# Patient Record
Sex: MALE | Race: WHITE | Employment: OTHER | URBAN - METROPOLITAN AREA
[De-identification: names, ages, dates, MRNs, and addresses within clinical notes are randomized per-mention and may not be internally consistent; named-entity substitution may affect disease eponyms.]

---

## 2017-01-13 RX ORDER — ATORVASTATIN CALCIUM 10 MG/1
10 TABLET, FILM COATED ORAL NIGHTLY
Qty: 90 TABLET | Refills: 0 | Status: SHIPPED | OUTPATIENT
Start: 2017-01-13 | End: 2017-05-08

## 2017-03-06 ENCOUNTER — TELEPHONE (OUTPATIENT)
Dept: INTERNAL MEDICINE CLINIC | Facility: CLINIC | Age: 72
End: 2017-03-06

## 2017-03-06 DIAGNOSIS — R91.1 PULMONARY NODULE: Primary | ICD-10-CM

## 2017-03-06 NOTE — TELEPHONE ENCOUNTER
pls call pt; he is now due for his ffup ct chest for ffup of pulmonary nodule see on his previous ct scan last year.  Order in epic

## 2017-03-07 NOTE — TELEPHONE ENCOUNTER
Phone call to patient. S/w patient and related Dr. Whalen Se message it's time for f/u CT of chest. Patient states he understands, Advised order in system, call op scheduling for appt.

## 2017-04-13 ENCOUNTER — HOSPITAL ENCOUNTER (OUTPATIENT)
Dept: CT IMAGING | Age: 72
Discharge: HOME | End: 2017-04-13
Attending: INTERNAL MEDICINE
Payer: MEDICARE

## 2017-04-13 DIAGNOSIS — R91.1 PULMONARY NODULE: ICD-10-CM

## 2017-04-13 PROCEDURE — 71250 CT THORAX DX C-: CPT

## 2017-05-08 RX ORDER — ATORVASTATIN CALCIUM 10 MG/1
TABLET, FILM COATED ORAL
Qty: 90 TABLET | Refills: 0 | Status: SHIPPED | OUTPATIENT
Start: 2017-05-08 | End: 2017-08-10

## 2017-05-08 RX ORDER — AMLODIPINE BESYLATE 5 MG/1
TABLET ORAL
Qty: 90 TABLET | Refills: 0 | Status: SHIPPED | OUTPATIENT
Start: 2017-05-08 | End: 2017-08-10

## 2017-08-11 RX ORDER — AMLODIPINE BESYLATE 5 MG/1
TABLET ORAL
Qty: 90 TABLET | Refills: 0 | Status: SHIPPED | OUTPATIENT
Start: 2017-08-11 | End: 2018-11-04

## 2017-08-11 RX ORDER — ATORVASTATIN CALCIUM 10 MG/1
TABLET, FILM COATED ORAL
Qty: 90 TABLET | Refills: 0 | Status: SHIPPED | OUTPATIENT
Start: 2017-08-11 | End: 2018-11-12

## 2017-11-15 RX ORDER — ATORVASTATIN CALCIUM 10 MG/1
TABLET, FILM COATED ORAL
Qty: 90 TABLET | Refills: 0 | OUTPATIENT
Start: 2017-11-15

## 2017-11-15 RX ORDER — AMLODIPINE BESYLATE 5 MG/1
TABLET ORAL
Qty: 90 TABLET | Refills: 0 | OUTPATIENT
Start: 2017-11-15

## 2017-11-15 NOTE — TELEPHONE ENCOUNTER
Pt failed protocol due to lov and date of last labs, I called him and he states he is not in need of a refill at present,  He plans on seeing Dr. Zachary Stephens next month and will call back to schedule follow up.   Refills denied as pt states he has \"plenty o

## 2017-11-15 NOTE — TELEPHONE ENCOUNTER
Hypertensive Medications  Protocol Criteria:  · Appointment scheduled in the past 6 months or in the next 3 months  · BMP or CMP in the past 12 months  · Creatinine result < 2  Recent Outpatient Visits            1 year ago Preop general physical exam    E

## 2017-12-27 RX ORDER — LOSARTAN POTASSIUM AND HYDROCHLOROTHIAZIDE 25; 100 MG/1; MG/1
TABLET ORAL
Qty: 90 TABLET | Refills: 1 | Status: SHIPPED | OUTPATIENT
Start: 2017-12-27 | End: 2018-07-01

## 2017-12-31 RX ORDER — BUDESONIDE AND FORMOTEROL FUMARATE DIHYDRATE 160; 4.5 UG/1; UG/1
2 AEROSOL RESPIRATORY (INHALATION) 2 TIMES DAILY
Qty: 10 INHALER | Refills: 0 | Status: SHIPPED | OUTPATIENT
Start: 2017-12-31

## 2018-02-17 ENCOUNTER — NURSE TRIAGE (OUTPATIENT)
Dept: INTERNAL MEDICINE CLINIC | Facility: CLINIC | Age: 73
End: 2018-02-17

## 2018-02-17 NOTE — TELEPHONE ENCOUNTER
Action Requested: Summary for Provider     []  Critical Lab, Recommendations Needed  [x] Need Additional Advice  [x]   FYI    []   Need Orders  [] Need Medications Sent to Pharmacy  []  Other     SUMMARY: APPT CREATED, Dysrhythmia    Pt states for approx 2

## 2018-02-26 ENCOUNTER — OFFICE VISIT (OUTPATIENT)
Dept: INTERNAL MEDICINE CLINIC | Facility: CLINIC | Age: 73
End: 2018-02-26

## 2018-02-26 VITALS
RESPIRATION RATE: 12 BRPM | SYSTOLIC BLOOD PRESSURE: 155 MMHG | HEIGHT: 65 IN | TEMPERATURE: 98 F | HEART RATE: 61 BPM | WEIGHT: 207 LBS | DIASTOLIC BLOOD PRESSURE: 84 MMHG | BODY MASS INDEX: 34.49 KG/M2

## 2018-02-26 DIAGNOSIS — Z12.5 PROSTATE CANCER SCREENING: ICD-10-CM

## 2018-02-26 DIAGNOSIS — E78.2 MIXED HYPERLIPIDEMIA: ICD-10-CM

## 2018-02-26 DIAGNOSIS — I10 ESSENTIAL HYPERTENSION WITH GOAL BLOOD PRESSURE LESS THAN 140/90: ICD-10-CM

## 2018-02-26 DIAGNOSIS — R91.1 PULMONARY NODULE: ICD-10-CM

## 2018-02-26 DIAGNOSIS — E55.9 VITAMIN D DEFICIENCY: ICD-10-CM

## 2018-02-26 DIAGNOSIS — R00.2 PALPITATIONS: Primary | ICD-10-CM

## 2018-02-26 PROCEDURE — G0463 HOSPITAL OUTPT CLINIC VISIT: HCPCS | Performed by: INTERNAL MEDICINE

## 2018-02-26 PROCEDURE — 99214 OFFICE O/P EST MOD 30 MIN: CPT | Performed by: INTERNAL MEDICINE

## 2018-02-26 PROCEDURE — 93010 ELECTROCARDIOGRAM REPORT: CPT | Performed by: INTERNAL MEDICINE

## 2018-02-26 PROCEDURE — 93005 ELECTROCARDIOGRAM TRACING: CPT | Performed by: INTERNAL MEDICINE

## 2018-02-26 RX ORDER — CLOBETASOL PROPIONATE 0.05 MG/G
GEL TOPICAL
Refills: 2 | COMMUNITY
Start: 2017-12-27 | End: 2021-05-10

## 2018-02-26 NOTE — PROGRESS NOTES
HPI:    Patient ID: Nadir Moore is a 81year old male. Palpitations    This is a new problem. The current episode started 1 to 4 weeks ago (3 to 4 weeks). The problem occurs intermittently. The problem has been waxing and waning.  Episode Length: no Disp:  Rfl: 2     Allergies:  Seasonal                   PHYSICAL EXAM:   Physical Exam   Constitutional: He appears well-developed and well-nourished. No distress.    HENT:   Right Ear: External ear normal.   Left Ear: External ear normal.   Nose: Nose nor night. I told he he can take his bp meds all at the same time instead of current dosing of am for lostartan and HS for amlodipine.   Monitor bp at home and call back if remains  Elevated.    (E78.2) Mixed hyperlipidemia  Plan: COMP METABOLIC PANEL (14), LIP

## 2018-03-22 ENCOUNTER — NURSE TRIAGE (OUTPATIENT)
Dept: OTHER | Age: 73
End: 2018-03-22

## 2018-03-22 RX ORDER — CODEINE PHOSPHATE AND GUAIFENESIN 10; 100 MG/5ML; MG/5ML
5 SOLUTION ORAL EVERY 6 HOURS PRN
Qty: 240 ML | Refills: 0 | Status: CANCELLED
Start: 2018-03-22

## 2018-03-22 RX ORDER — CODEINE PHOSPHATE AND GUAIFENESIN 10; 100 MG/5ML; MG/5ML
5 SOLUTION ORAL EVERY 6 HOURS PRN
Qty: 240 ML | Refills: 0 | OUTPATIENT
Start: 2018-03-22 | End: 2020-04-30

## 2018-03-22 NOTE — TELEPHONE ENCOUNTER
Ok to refill cheratussin ac as requested; I did sign the script but needs to be called in to pharmacy. Thank you.

## 2018-03-22 NOTE — TELEPHONE ENCOUNTER
From: Susie Lozano  Sent: 3/22/2018 10:13 AM CDT  Subject: Medication Renewal Request    Marino Lozano would like a refill of the following medications:        guaiFENesin-codeine 100-10 MG/5ML Oral Solution Patricia Martin MD]      Patient Co

## 2018-03-22 NOTE — TELEPHONE ENCOUNTER
Phoned in refill to pharmacy.     Called patient - verified patient's name and  - informed pt med refill - patient verbalized understanding

## 2018-03-22 NOTE — TELEPHONE ENCOUNTER
Action Requested: Summary for Provider     []  Critical Lab, Recommendations Needed  [] Need Additional Advice  []   FYI    []   Need Orders  [] Need Medications Sent to Pharmacy  []  Other     SUMMARY:   Please advise.   The patient is asking for a refill

## 2018-04-05 ENCOUNTER — LAB ENCOUNTER (OUTPATIENT)
Dept: LAB | Age: 73
End: 2018-04-05
Attending: INTERNAL MEDICINE
Payer: MEDICARE

## 2018-04-05 DIAGNOSIS — E78.2 MIXED HYPERLIPIDEMIA: ICD-10-CM

## 2018-04-05 DIAGNOSIS — R00.2 PALPITATIONS: ICD-10-CM

## 2018-04-05 DIAGNOSIS — E55.9 VITAMIN D DEFICIENCY: ICD-10-CM

## 2018-04-05 DIAGNOSIS — Z12.5 PROSTATE CANCER SCREENING: ICD-10-CM

## 2018-04-05 PROCEDURE — 36415 COLL VENOUS BLD VENIPUNCTURE: CPT

## 2018-04-05 PROCEDURE — 80061 LIPID PANEL: CPT

## 2018-04-05 PROCEDURE — 82306 VITAMIN D 25 HYDROXY: CPT

## 2018-04-05 PROCEDURE — 85025 COMPLETE CBC W/AUTO DIFF WBC: CPT

## 2018-04-05 PROCEDURE — 80053 COMPREHEN METABOLIC PANEL: CPT

## 2018-04-08 ENCOUNTER — TELEPHONE (OUTPATIENT)
Dept: INTERNAL MEDICINE CLINIC | Facility: CLINIC | Age: 73
End: 2018-04-08

## 2018-04-08 NOTE — TELEPHONE ENCOUNTER
pls notify Dr Sara Hugo,  His labs shows vit D low at 14 only; start vit D (drisdol) 50,000 units po weekly for 12 weeks and then recheck vit D in 3mos. His cholesterol levels are in good range   His cbc was fine.   His psa is mildly elevated at 4.5 and

## 2018-04-09 ENCOUNTER — TELEPHONE (OUTPATIENT)
Dept: INTERNAL MEDICINE CLINIC | Facility: CLINIC | Age: 73
End: 2018-04-09

## 2018-04-09 DIAGNOSIS — E55.9 VITAMIN D DEFICIENCY: Primary | ICD-10-CM

## 2018-04-09 DIAGNOSIS — R97.20 PSA ELEVATION: ICD-10-CM

## 2018-04-09 RX ORDER — ERGOCALCIFEROL (VITAMIN D2) 1250 MCG
50000 CAPSULE ORAL WEEKLY
Qty: 12 CAPSULE | Refills: 0 | Status: SHIPPED | OUTPATIENT
Start: 2018-04-09 | End: 2019-11-07

## 2018-04-09 NOTE — TELEPHONE ENCOUNTER
Spoke with wife Shirley and Benjy re:EL message below    EL--ok for generic Drisdol (ergocalciferol) or do you want brand name?

## 2018-04-09 NOTE — TELEPHONE ENCOUNTER
pls notify Dr Adilia Clifton,  His labs shows vit D low at 14 only; start vit D (drisdol) 50,000 units po weekly for 12 weeks and then recheck vit D in 3mos. His cholesterol levels are in good range   His cbc was fine.   His psa is mildly elevated at 4.5 and

## 2018-04-09 NOTE — TELEPHONE ENCOUNTER
Chart reviewed, advised patient that ergocalciferol prescribed for vitamin D, sent to the pharmacy  Spoke with patient (identified name and ), results reviewed and agrees with plan.

## 2018-04-18 ENCOUNTER — HOSPITAL ENCOUNTER (OUTPATIENT)
Dept: CARDIOLOGY CLINIC | Age: 73
Discharge: HOME | End: 2018-04-18
Attending: INTERNAL MEDICINE
Payer: MEDICARE

## 2018-04-18 DIAGNOSIS — R00.2 PALPITATIONS: ICD-10-CM

## 2018-04-18 PROCEDURE — 93306 TTE W/DOPPLER COMPLETE: CPT | Performed by: INTERNAL MEDICINE

## 2018-06-07 ENCOUNTER — HOSPITAL ENCOUNTER (OUTPATIENT)
Dept: CT IMAGING | Age: 73
Discharge: HOME | End: 2018-06-07
Attending: INTERNAL MEDICINE
Payer: MEDICARE

## 2018-06-07 DIAGNOSIS — R91.1 PULMONARY NODULE: ICD-10-CM

## 2018-06-07 PROCEDURE — 71250 CT THORAX DX C-: CPT | Performed by: INTERNAL MEDICINE

## 2018-07-01 RX ORDER — LOSARTAN POTASSIUM AND HYDROCHLOROTHIAZIDE 25; 100 MG/1; MG/1
TABLET ORAL
Qty: 90 TABLET | Refills: 0 | Status: SHIPPED | OUTPATIENT
Start: 2018-07-01 | End: 2018-10-03

## 2018-07-02 ENCOUNTER — PATIENT MESSAGE (OUTPATIENT)
Dept: INTERNAL MEDICINE CLINIC | Facility: CLINIC | Age: 73
End: 2018-07-02

## 2018-07-02 NOTE — TELEPHONE ENCOUNTER
Hypertensive Medications  Protocol Criteria:  · Appointment scheduled in the past 6 months or in the next 3 months  · BMP or CMP in the past 12 months  · Creatinine result < 2  Recent Outpatient Visits            4 months ago Palpitations    [facility]

## 2018-07-02 NOTE — TELEPHONE ENCOUNTER
From: Mathew Lozano  To: Moisés Brooks MD  Sent: 7/2/2018 9:15 AM CDT  Subject: Visit Follow-up Question    I will be taking my last Vit D2 96788 units on Wednesday. Do I need an order for lab re testing.  Thanks ESN

## 2018-07-02 NOTE — TELEPHONE ENCOUNTER
EL message sent to patient via [employer]--order for repeat Vit D level in system. Note      pls notify Dr Laurance Goodpasture,  His labs shows vit D low at 14 only; start vit D (drisdol) 50,000 units po weekly for 12 weeks and then recheck vit D in 3mos.

## 2018-07-03 ENCOUNTER — PATIENT MESSAGE (OUTPATIENT)
Dept: INTERNAL MEDICINE CLINIC | Facility: CLINIC | Age: 73
End: 2018-07-03

## 2018-07-03 NOTE — TELEPHONE ENCOUNTER
From: Alison Lozano  To: Vivek Cantu MD  Sent: 7/3/2018 3:00 PM CDT  Subject: Prescription Question    Requesting a prescription for Levitra 20 mg #12. Please fax to me at [phone]. Thank you, ELENA Lozano.   47

## 2018-07-03 NOTE — TELEPHONE ENCOUNTER
pls see refill request. Pt would like script faxed to him? [id number]. No dx on file for levitra. LR- 3/14/16  No future appointments.

## 2018-07-05 RX ORDER — VARDENAFIL HYDROCHLORIDE 20 MG/1
20 TABLET ORAL
Qty: 90 TABLET | Refills: 1 | Status: CANCELLED | OUTPATIENT
Start: 2018-07-05

## 2018-07-09 ENCOUNTER — APPOINTMENT (OUTPATIENT)
Dept: LAB | Age: 73
End: 2018-07-09
Attending: INTERNAL MEDICINE
Payer: MEDICARE

## 2018-07-09 DIAGNOSIS — E55.9 VITAMIN D DEFICIENCY: ICD-10-CM

## 2018-07-09 LAB — 25(OH)D3 SERPL-MCNC: 32.9 NG/ML

## 2018-07-09 PROCEDURE — 36415 COLL VENOUS BLD VENIPUNCTURE: CPT

## 2018-07-09 PROCEDURE — 82306 VITAMIN D 25 HYDROXY: CPT

## 2018-07-12 RX ORDER — VARDENAFIL HYDROCHLORIDE 20 MG/1
TABLET ORAL
Qty: 12 TABLET | Refills: 0 | Status: SHIPPED | OUTPATIENT
Start: 2018-07-12 | End: 2020-04-30

## 2018-07-12 RX ORDER — ERGOCALCIFEROL 1.25 MG/1
50000 CAPSULE ORAL WEEKLY
Qty: 12 CAPSULE | Refills: 0 | OUTPATIENT
Start: 2018-07-12

## 2018-07-12 NOTE — TELEPHONE ENCOUNTER
His vit d level test came back normal already so will not need high dose vit d anymore. Switched to otc vit D3 at 1k units po daily as maintenance.

## 2018-10-03 RX ORDER — LOSARTAN POTASSIUM AND HYDROCHLOROTHIAZIDE 25; 100 MG/1; MG/1
TABLET ORAL
Qty: 90 TABLET | Refills: 0 | Status: SHIPPED | OUTPATIENT
Start: 2018-10-03 | End: 2019-01-02

## 2018-11-04 RX ORDER — AMLODIPINE BESYLATE 5 MG/1
TABLET ORAL
Qty: 90 TABLET | Refills: 0 | Status: SHIPPED | OUTPATIENT
Start: 2018-11-04 | End: 2019-03-02

## 2018-11-07 ENCOUNTER — TELEPHONE (OUTPATIENT)
Dept: INTERNAL MEDICINE CLINIC | Facility: CLINIC | Age: 73
End: 2018-11-07

## 2018-11-07 NOTE — TELEPHONE ENCOUNTER
Fax received from [facility] that patient received Fluad Im, left arm on 11-5-18. Immunization record updated. Original placed for scanning to chart.

## 2018-11-12 RX ORDER — ATORVASTATIN CALCIUM 10 MG/1
TABLET, FILM COATED ORAL
Qty: 90 TABLET | Refills: 0 | Status: SHIPPED | OUTPATIENT
Start: 2018-11-12 | End: 2019-02-05

## 2019-01-03 ENCOUNTER — HOSPITAL ENCOUNTER (OUTPATIENT)
Dept: CT IMAGING | Facility: HOSPITAL | Age: 74
Discharge: HOME | End: 2019-01-03
Attending: INTERNAL MEDICINE

## 2019-01-03 DIAGNOSIS — Z13.9 SCREENING FOR CONDITION: ICD-10-CM

## 2019-01-04 RX ORDER — LOSARTAN POTASSIUM AND HYDROCHLOROTHIAZIDE 25; 100 MG/1; MG/1
TABLET ORAL
Qty: 90 TABLET | Refills: 0 | Status: SHIPPED | OUTPATIENT
Start: 2019-01-04 | End: 2019-09-17

## 2019-02-05 ENCOUNTER — TELEPHONE (OUTPATIENT)
Dept: INTERNAL MEDICINE CLINIC | Facility: CLINIC | Age: 74
End: 2019-02-05

## 2019-02-06 RX ORDER — ATORVASTATIN CALCIUM 10 MG/1
TABLET, FILM COATED ORAL
Qty: 90 TABLET | Refills: 0 | Status: SHIPPED | OUTPATIENT
Start: 2019-02-06 | End: 2019-05-07

## 2019-02-08 NOTE — TELEPHONE ENCOUNTER
Current Outpatient Medications:   0  •  AMLODIPINE BESYLATE 5 MG Oral Tab, TAKE 1 TABLET BY MOUTH EVERY DAY, Disp: 90 tablet, Rfl: 0

## 2019-02-09 NOTE — TELEPHONE ENCOUNTER
Sent to on call as EL out of office     Please review; protocol failed.     Hypertensive Medications  Protocol Criteria:  · Appointment scheduled in the past 6 months or in the next 3 months  · BMP or CMP in the past 12 months  · Creatinine result < 2  Rece

## 2019-02-11 ENCOUNTER — HOSPITAL ENCOUNTER (OUTPATIENT)
Dept: ULTRASOUND IMAGING | Age: 74
Discharge: HOME | End: 2019-02-11
Attending: INTERNAL MEDICINE

## 2019-02-11 DIAGNOSIS — Z13.9 ENCOUNTER FOR SCREENING: ICD-10-CM

## 2019-03-02 NOTE — TELEPHONE ENCOUNTER
Please review; protocol failed.     Pt overdue for OV    CSS please contact pt to schedule    Routed to MD for review

## 2019-03-03 RX ORDER — AMLODIPINE BESYLATE 5 MG/1
TABLET ORAL
Qty: 90 TABLET | Refills: 0 | Status: SHIPPED | OUTPATIENT
Start: 2019-03-03 | End: 2020-01-16

## 2019-04-24 ENCOUNTER — OFFICE VISIT (OUTPATIENT)
Dept: INTERNAL MEDICINE CLINIC | Facility: CLINIC | Age: 74
End: 2019-04-24
Payer: MEDICARE

## 2019-04-24 VITALS
BODY MASS INDEX: 35.32 KG/M2 | TEMPERATURE: 98 F | DIASTOLIC BLOOD PRESSURE: 84 MMHG | WEIGHT: 212 LBS | SYSTOLIC BLOOD PRESSURE: 150 MMHG | HEIGHT: 65 IN | HEART RATE: 64 BPM

## 2019-04-24 DIAGNOSIS — Z12.5 PROSTATE CANCER SCREENING: ICD-10-CM

## 2019-04-24 DIAGNOSIS — I10 ESSENTIAL HYPERTENSION: ICD-10-CM

## 2019-04-24 DIAGNOSIS — E78.2 MIXED HYPERLIPIDEMIA: ICD-10-CM

## 2019-04-24 DIAGNOSIS — G47.62 NOCTURNAL LEG CRAMPS: ICD-10-CM

## 2019-04-24 DIAGNOSIS — E55.9 VITAMIN D DEFICIENCY: ICD-10-CM

## 2019-04-24 DIAGNOSIS — N52.9 ERECTILE DYSFUNCTION, UNSPECIFIED ERECTILE DYSFUNCTION TYPE: ICD-10-CM

## 2019-04-24 DIAGNOSIS — H53.8 BLURRING OF VISION: ICD-10-CM

## 2019-04-24 DIAGNOSIS — Z00.00 MEDICARE ANNUAL WELLNESS VISIT, SUBSEQUENT: Primary | ICD-10-CM

## 2019-04-24 PROBLEM — R00.2 PALPITATIONS: Status: RESOLVED | Noted: 2018-02-26 | Resolved: 2019-04-24

## 2019-04-24 PROBLEM — R91.1 PULMONARY NODULE: Status: RESOLVED | Noted: 2018-02-26 | Resolved: 2019-04-24

## 2019-04-24 PROCEDURE — 90670 PCV13 VACCINE IM: CPT | Performed by: INTERNAL MEDICINE

## 2019-04-24 PROCEDURE — G0439 PPPS, SUBSEQ VISIT: HCPCS | Performed by: INTERNAL MEDICINE

## 2019-04-24 PROCEDURE — G0009 ADMIN PNEUMOCOCCAL VACCINE: HCPCS | Performed by: INTERNAL MEDICINE

## 2019-04-24 PROCEDURE — G0463 HOSPITAL OUTPT CLINIC VISIT: HCPCS | Performed by: INTERNAL MEDICINE

## 2019-04-24 RX ORDER — VARDENAFIL HYDROCHLORIDE 20 MG/1
TABLET ORAL
Qty: 10 TABLET | Refills: 11 | Status: SHIPPED | OUTPATIENT
Start: 2019-04-24 | End: 2021-04-17

## 2019-04-24 NOTE — PROGRESS NOTES
HPI:   Gely Montiel is a 69year old male who presents for a Medicare Subsequent Annual Wellness visit (Pt already had Initial Annual Wellness).       His last annual assessment has been over 1 year: Annual Physical due on 04/14/1947         Fall/Risk Component Value Date    CHOLEST 153 04/25/2019    HDL 40 04/25/2019     (H) 04/25/2019    TRIG 64 04/25/2019          Last Chemistry Labs:   Lab Results   Component Value Date    AST 17 04/25/2019    ALT 26 04/25/2019    CA 9.1 04/25/2019    ALB 3 SYSTEMS:   GENERAL: feels well otherwise  SKIN: denies any unusual skin lesions  EYES: denies blurred vision or double vision  HEENT: denies nasal congestion, sinus pain or ST  LUNGS: denies shortness of breath with exertion  CARDIOVASCULAR: denies chest p because I misunderstand what they say:  No   I misunderstand what others are saying and make inappropriate responses:  No I avoid social activities because I cannot hear well and fear I will reply improperly:  No   Family members and friends have told me t 12/11/2017, 11/05/2018   • HIGH DOSE FLU 65 YRS AND OLDER PRSV FREE SINGLE D (88288) FLU CLINIC 12/01/2016   • Pneumococcal (Prevnar 13) 04/24/2019        ASSESSMENT AND OTHER RELEVANT CHRONIC CONDITIONS:   Jeri Gregory is a 69year old male who pres you lost more than 10 pounds without trying?: 2 - No  Has your appetite been poor?: No  How would you describe your daily physical activity?: Light  How do you maintain positive mental well-being?: Social Interaction; Visiting Friends; Visiting Family    [facility] concentrates   Clients of institutions for the mentally retarded   Persons who live in the same house as a HepB virus carrier   Homosexual men   Illicit injectable drug abusers     Tetanus Toxoid  Only covered with a cut with metal- TD and TDaP Not covered

## 2019-04-25 ENCOUNTER — LAB ENCOUNTER (OUTPATIENT)
Dept: LAB | Age: 74
End: 2019-04-25
Attending: PODIATRIST
Payer: MEDICARE

## 2019-04-25 DIAGNOSIS — Z12.5 PROSTATE CANCER SCREENING: ICD-10-CM

## 2019-04-25 DIAGNOSIS — N52.9 ERECTILE DYSFUNCTION, UNSPECIFIED ERECTILE DYSFUNCTION TYPE: ICD-10-CM

## 2019-04-25 DIAGNOSIS — Z00.00 MEDICARE ANNUAL WELLNESS VISIT, SUBSEQUENT: ICD-10-CM

## 2019-04-25 DIAGNOSIS — E55.9 VITAMIN D DEFICIENCY: ICD-10-CM

## 2019-04-25 DIAGNOSIS — G47.62 NOCTURNAL LEG CRAMPS: ICD-10-CM

## 2019-04-25 DIAGNOSIS — E78.2 MIXED HYPERLIPIDEMIA: ICD-10-CM

## 2019-04-25 PROBLEM — H53.8 BLURRING OF VISION: Status: ACTIVE | Noted: 2019-04-25

## 2019-04-25 PROCEDURE — 36415 COLL VENOUS BLD VENIPUNCTURE: CPT

## 2019-04-25 PROCEDURE — 82306 VITAMIN D 25 HYDROXY: CPT

## 2019-04-25 PROCEDURE — 84402 ASSAY OF FREE TESTOSTERONE: CPT

## 2019-04-25 PROCEDURE — 80053 COMPREHEN METABOLIC PANEL: CPT

## 2019-04-25 PROCEDURE — 85025 COMPLETE CBC W/AUTO DIFF WBC: CPT

## 2019-04-25 PROCEDURE — 84443 ASSAY THYROID STIM HORMONE: CPT

## 2019-04-25 PROCEDURE — 84403 ASSAY OF TOTAL TESTOSTERONE: CPT

## 2019-04-25 PROCEDURE — 83735 ASSAY OF MAGNESIUM: CPT

## 2019-04-25 PROCEDURE — 81003 URINALYSIS AUTO W/O SCOPE: CPT

## 2019-04-25 PROCEDURE — 80061 LIPID PANEL: CPT

## 2019-05-07 RX ORDER — ATORVASTATIN CALCIUM 10 MG/1
TABLET, FILM COATED ORAL
Qty: 90 TABLET | Refills: 1 | Status: SHIPPED | OUTPATIENT
Start: 2019-05-07 | End: 2020-12-12

## 2019-05-24 RX ORDER — AMLODIPINE BESYLATE 5 MG/1
5 TABLET ORAL
Qty: 90 TABLET | Refills: 0 | OUTPATIENT
Start: 2019-05-24

## 2019-09-18 RX ORDER — LOSARTAN POTASSIUM AND HYDROCHLOROTHIAZIDE 25; 100 MG/1; MG/1
TABLET ORAL
Qty: 90 TABLET | Refills: 1 | Status: SHIPPED | OUTPATIENT
Start: 2019-09-18 | End: 2019-09-23 | Stop reason: RX

## 2019-09-19 NOTE — TELEPHONE ENCOUNTER
Refill passed per [facility] protocol.   Hypertensive Medications  Protocol Criteria:  · Appointment scheduled in the past 6 months or in the next 3 months  · BMP or CMP in the past 12 months  · Creatinine result < 2  Recent Outpatient Visits

## 2019-09-23 ENCOUNTER — TELEPHONE (OUTPATIENT)
Dept: INTERNAL MEDICINE CLINIC | Facility: CLINIC | Age: 74
End: 2019-09-23

## 2019-09-23 RX ORDER — LOSARTAN POTASSIUM 100 MG/1
100 TABLET ORAL DAILY
Qty: 90 TABLET | Refills: 1 | Status: SHIPPED | OUTPATIENT
Start: 2019-09-23 | End: 2020-03-22

## 2019-09-23 RX ORDER — HYDROCHLOROTHIAZIDE 25 MG/1
25 TABLET ORAL DAILY
Qty: 90 TABLET | Refills: 1 | Status: SHIPPED | OUTPATIENT
Start: 2019-09-23 | End: 2021-05-10

## 2019-09-23 NOTE — TELEPHONE ENCOUNTER
Per pharmacy following medication is on backorder/unavailable. Losartan Potassium-HCTZ 100-25 MG Oral Tab, TAKE 1 TABLET BY MOUTH EVERY DAY, Disp: 90 tablet, Rfl: 1    Comments: Can this medication be split into 2 rxs?

## 2019-10-04 ENCOUNTER — NURSE TRIAGE (OUTPATIENT)
Dept: INTERNAL MEDICINE CLINIC | Facility: CLINIC | Age: 74
End: 2019-10-04

## 2019-10-04 ENCOUNTER — APPOINTMENT (OUTPATIENT)
Dept: LAB | Age: 74
End: 2019-10-04
Attending: INTERNAL MEDICINE
Payer: MEDICARE

## 2019-10-04 ENCOUNTER — LAB ENCOUNTER (OUTPATIENT)
Dept: LAB | Age: 74
End: 2019-10-04
Attending: INTERNAL MEDICINE
Payer: MEDICARE

## 2019-10-04 ENCOUNTER — OFFICE VISIT (OUTPATIENT)
Dept: INTERNAL MEDICINE CLINIC | Facility: CLINIC | Age: 74
End: 2019-10-04
Payer: MEDICARE

## 2019-10-04 VITALS
HEART RATE: 71 BPM | HEIGHT: 66 IN | TEMPERATURE: 98 F | DIASTOLIC BLOOD PRESSURE: 87 MMHG | BODY MASS INDEX: 33.65 KG/M2 | WEIGHT: 209.38 LBS | SYSTOLIC BLOOD PRESSURE: 146 MMHG

## 2019-10-04 DIAGNOSIS — R00.2 PALPITATIONS: Primary | ICD-10-CM

## 2019-10-04 DIAGNOSIS — R00.2 PALPITATION: ICD-10-CM

## 2019-10-04 DIAGNOSIS — R00.2 PALPITATION: Primary | ICD-10-CM

## 2019-10-04 PROCEDURE — 93010 ELECTROCARDIOGRAM REPORT: CPT | Performed by: INTERNAL MEDICINE

## 2019-10-04 PROCEDURE — 84443 ASSAY THYROID STIM HORMONE: CPT

## 2019-10-04 PROCEDURE — 80053 COMPREHEN METABOLIC PANEL: CPT

## 2019-10-04 PROCEDURE — 36415 COLL VENOUS BLD VENIPUNCTURE: CPT

## 2019-10-04 PROCEDURE — 83735 ASSAY OF MAGNESIUM: CPT

## 2019-10-04 PROCEDURE — 85027 COMPLETE CBC AUTOMATED: CPT

## 2019-10-04 PROCEDURE — G0463 HOSPITAL OUTPT CLINIC VISIT: HCPCS | Performed by: INTERNAL MEDICINE

## 2019-10-04 PROCEDURE — 93005 ELECTROCARDIOGRAM TRACING: CPT

## 2019-10-04 PROCEDURE — 99214 OFFICE O/P EST MOD 30 MIN: CPT | Performed by: INTERNAL MEDICINE

## 2019-10-04 NOTE — TELEPHONE ENCOUNTER
Action Requested: Summary for Provider     []  Critical Lab, Recommendations Needed  [] Need Additional Advice  []   FYI    []   Need Orders  [] Need Medications Sent to Pharmacy  []  Other     SUMMARY:   Appointment made for today 10/4/19 per patient afte

## 2019-10-04 NOTE — PROGRESS NOTES
HPI:    Patient ID: Martell Troncoso is a 69year old male. Chief Complaint: Irregular Heart Beat (c/o skiping heart beats, palpitations, low pulse rate. 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    Mushtaq episode started in the past 7 days. The problem occurs daily. The problem has been unchanged. Nothing aggravates the symptoms. Associated symptoms include malaise/fatigue and shortness of breath (episodic).  Pertinent negatives include no anxiety, chest bobby Normal rate and intact distal pulses. Occasional extrasystoles are present. No murmur heard. Edema not present. Pulmonary/Chest: Effort normal and breath sounds normal.   Abdominal: Soft.  Bowel sounds are normal.   Neurological: He is alert and liseth 1.570 10/04/2019       1. Echo  4/2018  - Study Conclusions  1. Left ventricle: The cavity size was normal. Wall thickness was     increased in a pattern of mild LVH.  Hypertrophy was noted.     Systolic function was normal. The estimated ejection fraction the raw patient data showed it was adequate for interpretation.       WALL MOTION ANALYSIS: The gated SPECT images showed normal wall motion.  An end diastolic volume of 47-AV was seen.       LV EJECTION FRACTION: 65 %.        PERFUSION IMAGING: Myocardial atherosclerotic plaque  Moderate risk, mild coronary artery disease highly likely,       significant atherosclerosis possible     401-1000  Definite, at least moderate atherosclerotic plaque  High risk, moderate coronary artery disease highly likely     >1 mouth daily. , Disp: 90 tablet, Rfl: 1  •  hydrochlorothiazide 25 MG Oral Tab, Take 1 tablet (25 mg total) by mouth daily. , Disp: 90 tablet, Rfl: 1  •  atorvastatin 10 MG Oral Tab, TAKE 1 TABLET BY MOUTH EVERY DAY AT NIGHT, Disp: 90 tablet, Rfl: 1  •  Varde inhalers and his symptoms, uses inhalers rarely.   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 fib and A. fib with RV NOTE-----------------------------------------     There are no Patient Instructions on file for this visit.

## 2019-10-05 PROBLEM — R00.2 PALPITATION: Status: ACTIVE | Noted: 2018-02-26

## 2019-10-07 ENCOUNTER — HOSPITAL ENCOUNTER (OUTPATIENT)
Dept: CV DIAGNOSTICS | Facility: HOSPITAL | Age: 74
Discharge: HOME | End: 2019-10-07
Attending: INTERNAL MEDICINE
Payer: MEDICARE

## 2019-10-07 DIAGNOSIS — R00.2 PALPITATION: ICD-10-CM

## 2019-10-07 PROCEDURE — 93225 XTRNL ECG REC<48 HRS REC: CPT | Performed by: INTERNAL MEDICINE

## 2019-10-07 PROCEDURE — 93227 XTRNL ECG REC<48 HR R&I: CPT | Performed by: INTERNAL MEDICINE

## 2019-10-29 ENCOUNTER — HOSPITAL ENCOUNTER (OUTPATIENT)
Dept: CV DIAGNOSTICS | Facility: HOSPITAL | Age: 74
Discharge: HOME | End: 2019-10-29
Attending: INTERNAL MEDICINE
Payer: MEDICARE

## 2019-10-29 DIAGNOSIS — R00.2 PALPITATION: ICD-10-CM

## 2019-10-29 PROCEDURE — 93017 CV STRESS TEST TRACING ONLY: CPT | Performed by: INTERNAL MEDICINE

## 2019-10-29 PROCEDURE — 93350 STRESS TTE ONLY: CPT | Performed by: INTERNAL MEDICINE

## 2019-10-29 PROCEDURE — 93016 CV STRESS TEST SUPVJ ONLY: CPT | Performed by: INTERNAL MEDICINE

## 2019-10-29 PROCEDURE — 93018 CV STRESS TEST I&R ONLY: CPT | Performed by: INTERNAL MEDICINE

## 2019-11-07 ENCOUNTER — OFFICE VISIT (OUTPATIENT)
Dept: CARDIOLOGY CLINIC | Facility: CLINIC | Age: 74
End: 2019-11-07
Payer: MEDICARE

## 2019-11-07 VITALS
BODY MASS INDEX: 33.59 KG/M2 | HEART RATE: 66 BPM | SYSTOLIC BLOOD PRESSURE: 178 MMHG | RESPIRATION RATE: 18 BRPM | DIASTOLIC BLOOD PRESSURE: 93 MMHG | WEIGHT: 209 LBS | HEIGHT: 66 IN

## 2019-11-07 DIAGNOSIS — R29.818 SUSPECTED SLEEP APNEA: ICD-10-CM

## 2019-11-07 DIAGNOSIS — I10 ESSENTIAL HYPERTENSION: ICD-10-CM

## 2019-11-07 DIAGNOSIS — I49.1 PAC (PREMATURE ATRIAL CONTRACTION): Primary | ICD-10-CM

## 2019-11-07 DIAGNOSIS — E66.09 OBESITY DUE TO EXCESS CALORIES, UNSPECIFIED CLASSIFICATION, UNSPECIFIED WHETHER SERIOUS COMORBIDITY PRESENT: ICD-10-CM

## 2019-11-07 PROCEDURE — G0463 HOSPITAL OUTPT CLINIC VISIT: HCPCS | Performed by: INTERNAL MEDICINE

## 2019-11-07 PROCEDURE — 99205 OFFICE O/P NEW HI 60 MIN: CPT | Performed by: INTERNAL MEDICINE

## 2019-11-07 RX ORDER — HYDRALAZINE HYDROCHLORIDE 25 MG/1
25 TABLET, FILM COATED ORAL 3 TIMES DAILY
Qty: 90 TABLET | Refills: 3 | Status: SHIPPED | OUTPATIENT
Start: 2019-11-07 | End: 2020-02-19

## 2019-11-07 NOTE — PROGRESS NOTES
[facility], [facility]    Cardiac Electrophysiology Consultation  2019    Name:  Lyndsey Richardson  :     Date of consultation:   2019    Referring physician: Dr. Keenan Zavala    Reason for Consultation:  palpitations    History of Present I drugs. Allergies:    Seasonal                    Medications:  losartan 100 MG Oral Tab, Take 1 tablet (100 mg total) by mouth daily. , Disp: 90 tablet, Rfl: 1  atorvastatin 10 MG Oral Tab, TAKE 1 TABLET BY MOUTH EVERY DAY AT NIGHT, Disp: 90 tablet, Rfl: pulses without bruits. No edema. Abdomen: Soft, nontender, central adiposity  Extremities:  No clubbing, cyanosis, or tenderness with calf palpation. Musculoskel: No joint deformities. Skin: Normal texture and turgor.   Neurologic: Alert and oriented x 3 encounter diagnosis)  2. Essential hypertension  3. Obesity due to excess calories, unspecified classification, unspecified whether serious comorbidity present  4.  Suspected sleep apnea    The patient is a 59-year-old with obesity, likely sleep apnea, resi

## 2019-11-07 NOTE — PATIENT INSTRUCTIONS
-Home sleep study  -Start hydralazine 25 mg orally three times a day  -Take all your blood pressure medications every day as prescribed. Your goal blood pressure is less than 130/80.   Continue to follow blood pressure and make additional changes if needed

## 2020-01-16 RX ORDER — AMLODIPINE BESYLATE 5 MG/1
TABLET ORAL
Qty: 90 TABLET | Refills: 0 | Status: SHIPPED | OUTPATIENT
Start: 2020-01-16 | End: 2020-04-09

## 2020-02-11 RX ORDER — HYDRALAZINE HYDROCHLORIDE 25 MG/1
TABLET, FILM COATED ORAL
Qty: 270 TABLET | Refills: 1 | OUTPATIENT
Start: 2020-02-11

## 2020-02-11 NOTE — TELEPHONE ENCOUNTER
Refill sent 11/7/19 90 tabs with 3 refills with message stating take one tab 3 times a day    He will also now add hydralazine 25 mg 3 times daily. Corrected script sent.

## 2020-02-19 RX ORDER — HYDRALAZINE HYDROCHLORIDE 25 MG/1
TABLET, FILM COATED ORAL
Qty: 270 TABLET | Refills: 1 | Status: SHIPPED | OUTPATIENT
Start: 2020-02-19 | End: 2021-05-10

## 2020-02-19 NOTE — TELEPHONE ENCOUNTER
Meets all protocol requirements below. Per LOV note 11/7/19 this medication was prescribed at that time as refill indications. Refill sent per protocol.

## 2020-03-22 RX ORDER — LOSARTAN POTASSIUM 100 MG/1
TABLET ORAL
Qty: 90 TABLET | Refills: 1 | Status: SHIPPED | OUTPATIENT
Start: 2020-03-22 | End: 2020-07-07 | Stop reason: RX

## 2020-04-09 RX ORDER — AMLODIPINE BESYLATE 5 MG/1
TABLET ORAL
Qty: 90 TABLET | Refills: 0 | Status: SHIPPED | OUTPATIENT
Start: 2020-04-09 | End: 2020-12-19

## 2020-04-30 ENCOUNTER — OFFICE VISIT (OUTPATIENT)
Dept: INTERNAL MEDICINE CLINIC | Facility: CLINIC | Age: 75
End: 2020-04-30
Payer: MEDICARE

## 2020-04-30 VITALS
HEART RATE: 67 BPM | HEIGHT: 65 IN | BODY MASS INDEX: 34.49 KG/M2 | TEMPERATURE: 98 F | SYSTOLIC BLOOD PRESSURE: 170 MMHG | DIASTOLIC BLOOD PRESSURE: 80 MMHG | WEIGHT: 207 LBS

## 2020-04-30 DIAGNOSIS — I25.10 ATHEROSCLEROSIS OF NATIVE CORONARY ARTERY OF NATIVE HEART WITHOUT ANGINA PECTORIS: ICD-10-CM

## 2020-04-30 DIAGNOSIS — R29.818 SUSPECTED SLEEP APNEA: ICD-10-CM

## 2020-04-30 DIAGNOSIS — H93.12 TINNITUS OF LEFT EAR: ICD-10-CM

## 2020-04-30 DIAGNOSIS — E78.2 MIXED HYPERLIPIDEMIA: ICD-10-CM

## 2020-04-30 DIAGNOSIS — E66.09 OBESITY DUE TO EXCESS CALORIES, UNSPECIFIED CLASSIFICATION, UNSPECIFIED WHETHER SERIOUS COMORBIDITY PRESENT: ICD-10-CM

## 2020-04-30 DIAGNOSIS — N52.9 ERECTILE DYSFUNCTION, UNSPECIFIED ERECTILE DYSFUNCTION TYPE: ICD-10-CM

## 2020-04-30 DIAGNOSIS — H53.8 BLURRED VISION, BILATERAL: ICD-10-CM

## 2020-04-30 DIAGNOSIS — Z00.00 MEDICARE ANNUAL WELLNESS VISIT, SUBSEQUENT: Primary | ICD-10-CM

## 2020-04-30 DIAGNOSIS — Z12.5 PROSTATE CANCER SCREENING: ICD-10-CM

## 2020-04-30 DIAGNOSIS — I10 ESSENTIAL HYPERTENSION: ICD-10-CM

## 2020-04-30 PROBLEM — I49.1 PAC (PREMATURE ATRIAL CONTRACTION): Status: RESOLVED | Noted: 2019-11-07 | Resolved: 2020-04-30

## 2020-04-30 PROBLEM — G47.62 NOCTURNAL LEG CRAMPS: Status: RESOLVED | Noted: 2019-04-25 | Resolved: 2020-04-30

## 2020-04-30 PROBLEM — E55.9 VITAMIN D DEFICIENCY: Status: RESOLVED | Noted: 2019-04-25 | Resolved: 2020-04-30

## 2020-04-30 PROCEDURE — G0439 PPPS, SUBSEQ VISIT: HCPCS | Performed by: INTERNAL MEDICINE

## 2020-04-30 PROCEDURE — 90732 PPSV23 VACC 2 YRS+ SUBQ/IM: CPT | Performed by: INTERNAL MEDICINE

## 2020-04-30 PROCEDURE — G0009 ADMIN PNEUMOCOCCAL VACCINE: HCPCS | Performed by: INTERNAL MEDICINE

## 2020-04-30 RX ORDER — VARDENAFIL HYDROCHLORIDE 20 MG/1
TABLET ORAL
Qty: 30 TABLET | Refills: 0 | Status: SHIPPED | OUTPATIENT
Start: 2020-04-30 | End: 2021-04-17

## 2020-04-30 RX ORDER — CODEINE PHOSPHATE AND GUAIFENESIN 10; 100 MG/5ML; MG/5ML
5 SOLUTION ORAL EVERY 6 HOURS PRN
Qty: 240 ML | Refills: 0 | Status: SHIPPED | OUTPATIENT
Start: 2020-04-30 | End: 2021-09-01

## 2020-04-30 NOTE — PROGRESS NOTES
HPI:   July Ruiz is a 70year old male who presents for a Medicare Subsequent Annual Wellness visit (Pt already had Initial Annual Wellness).       His last annual assessment has been over 1 year: Annual Physical due on 04/24/2020         Fall/Risk kg)  11/07/19 : 209 lb (94.8 kg)  10/04/19 : 209 lb 6.4 oz (95 kg)     Last Cholesterol Labs:   Lab Results   Component Value Date    CHOLEST 153 04/25/2019    HDL 40 04/25/2019     (H) 04/25/2019    TRIG 64 04/25/2019          Last Chemistry Labs: REVIEW OF SYSTEMS:   GENERAL: feels well otherwise  SKIN: denies any unusual skin lesions  EYES: denies blurred vision or double vision  HEENT: denies nasal congestion, sinus pain or ST  LUNGS: denies shortness of breath with exertion ; no hemoptysis to mumble (or don't speak clearly):  No People get annoyed because I misunderstand what they say:  No   I misunderstand what others are saying and make inappropriate responses:  No I avoid social activities because I cannot hear well and fear I will reply Pneumococcal (Prevnar 13) 04/24/2019   • Pneumovax 23 04/30/2020        ASSESSMENT AND OTHER RELEVANT CHRONIC CONDITIONS:   Michael Alonzo is a 70year old male who presents for a Medicare Assessment.      PLAN SUMMARY:   (Z00.00) Medicare annual wellne Atherosclerosis of native coronary artery of native heart without angina pectoris  Plan: stress test before normal. Continue with chol med and asa.     (R29.818) Suspected sleep apnea  Plan: pt snores, has hypertension so at risk for SIDNEY as had been advise FHx Glaucoma, AA>50, > 65 No flowsheet data found.     Prostate Cancer Screening      PSA  Annually PSA due on 04/25/2021  Update Health Maintenance if applicable     Immunizations (Update Immunization Activity if applicable)     Influenza  Covered

## 2020-06-11 ENCOUNTER — TELEPHONE (OUTPATIENT)
Dept: INTERNAL MEDICINE CLINIC | Facility: CLINIC | Age: 75
End: 2020-06-11

## 2020-06-11 NOTE — TELEPHONE ENCOUNTER
Spoke with Rosa at [facility] and relayed Dr. Wood Amaya message below--he verbalizes understanding and agreement. No further questions/concerns at this time.

## 2020-06-11 NOTE — TELEPHONE ENCOUNTER
Ebony, pharmacy tech From [facility] stated that for his Levitra  Medication it  needsto state the max number the patient can take a day. 1 tab 30 min before sex is not enough. The patient brought in a paper script.    They stated they can take a verbal orde

## 2020-07-07 ENCOUNTER — TELEPHONE (OUTPATIENT)
Dept: INTERNAL MEDICINE CLINIC | Facility: CLINIC | Age: 75
End: 2020-07-07

## 2020-07-07 RX ORDER — LOSARTAN POTASSIUM 50 MG/1
100 TABLET ORAL DAILY
Qty: 180 TABLET | Refills: 1 | Status: SHIPPED | OUTPATIENT
Start: 2020-07-07 | End: 2021-03-07

## 2020-07-07 NOTE — TELEPHONE ENCOUNTER
Current Outpatient Medications:   ••  LOSARTAN 100 MG Oral Tab, TAKE 1 TABLET BY MOUTH EVERY DAY, Disp: 90 tablet, Rfl: 1  •  100 mgs on backordered. Please send new RX. 50 mgs are currently available.

## 2020-10-01 ENCOUNTER — LAB ENCOUNTER (OUTPATIENT)
Dept: LAB | Age: 75
End: 2020-10-01
Attending: INTERNAL MEDICINE
Payer: MEDICARE

## 2020-10-01 DIAGNOSIS — Z00.00 MEDICARE ANNUAL WELLNESS VISIT, SUBSEQUENT: ICD-10-CM

## 2020-10-01 DIAGNOSIS — Z12.5 PROSTATE CANCER SCREENING: ICD-10-CM

## 2020-10-01 DIAGNOSIS — E78.2 MIXED HYPERLIPIDEMIA: ICD-10-CM

## 2020-10-01 PROCEDURE — 85025 COMPLETE CBC W/AUTO DIFF WBC: CPT

## 2020-10-01 PROCEDURE — 80053 COMPREHEN METABOLIC PANEL: CPT

## 2020-10-01 PROCEDURE — 80061 LIPID PANEL: CPT

## 2020-10-01 PROCEDURE — 36415 COLL VENOUS BLD VENIPUNCTURE: CPT

## 2020-10-01 PROCEDURE — 81001 URINALYSIS AUTO W/SCOPE: CPT

## 2020-10-02 ENCOUNTER — TELEPHONE (OUTPATIENT)
Dept: INTERNAL MEDICINE CLINIC | Facility: CLINIC | Age: 75
End: 2020-10-02

## 2020-10-02 NOTE — TELEPHONE ENCOUNTER
Fax received from [facility] advising us that patient received Pneumovax 23 and Fluad Quad PFS 20-21 at their pharmacy on 10-1-20. Immunization record updated. Form placed for scanning to chart.

## 2020-12-12 RX ORDER — ATORVASTATIN CALCIUM 10 MG/1
TABLET, FILM COATED ORAL
Qty: 90 TABLET | Refills: 1 | Status: SHIPPED | OUTPATIENT
Start: 2020-12-12 | End: 2021-05-10

## 2020-12-19 RX ORDER — AMLODIPINE BESYLATE 5 MG/1
5 TABLET ORAL DAILY
Qty: 90 TABLET | Refills: 1 | Status: SHIPPED | OUTPATIENT
Start: 2020-12-19 | End: 2021-05-10

## 2021-03-07 RX ORDER — LOSARTAN POTASSIUM 50 MG/1
TABLET ORAL
Qty: 180 TABLET | Refills: 1 | Status: SHIPPED | OUTPATIENT
Start: 2021-03-07 | End: 2021-05-10

## 2021-04-17 RX ORDER — VARDENAFIL HYDROCHLORIDE 20 MG/1
TABLET ORAL
Qty: 30 TABLET | Refills: 0 | Status: SHIPPED | OUTPATIENT
Start: 2021-04-17

## 2021-05-10 ENCOUNTER — LAB ENCOUNTER (OUTPATIENT)
Dept: LAB | Age: 76
End: 2021-05-10
Attending: INTERNAL MEDICINE
Payer: MEDICARE

## 2021-05-10 ENCOUNTER — OFFICE VISIT (OUTPATIENT)
Dept: INTERNAL MEDICINE CLINIC | Facility: CLINIC | Age: 76
End: 2021-05-10
Payer: MEDICARE

## 2021-05-10 VITALS
TEMPERATURE: 99 F | DIASTOLIC BLOOD PRESSURE: 84 MMHG | WEIGHT: 211 LBS | RESPIRATION RATE: 12 BRPM | SYSTOLIC BLOOD PRESSURE: 154 MMHG | HEIGHT: 65.5 IN | BODY MASS INDEX: 34.73 KG/M2 | HEART RATE: 66 BPM

## 2021-05-10 DIAGNOSIS — I10 ESSENTIAL HYPERTENSION: ICD-10-CM

## 2021-05-10 DIAGNOSIS — E66.09 CLASS 1 OBESITY DUE TO EXCESS CALORIES WITH BODY MASS INDEX (BMI) OF 34.0 TO 34.9 IN ADULT, UNSPECIFIED WHETHER SERIOUS COMORBIDITY PRESENT: ICD-10-CM

## 2021-05-10 DIAGNOSIS — E55.9 VITAMIN D DEFICIENCY: ICD-10-CM

## 2021-05-10 DIAGNOSIS — Z00.00 MEDICARE ANNUAL WELLNESS VISIT, SUBSEQUENT: Primary | ICD-10-CM

## 2021-05-10 DIAGNOSIS — E78.2 MIXED HYPERLIPIDEMIA: ICD-10-CM

## 2021-05-10 DIAGNOSIS — N52.9 ERECTILE DYSFUNCTION, UNSPECIFIED ERECTILE DYSFUNCTION TYPE: ICD-10-CM

## 2021-05-10 DIAGNOSIS — Z00.00 MEDICARE ANNUAL WELLNESS VISIT, SUBSEQUENT: ICD-10-CM

## 2021-05-10 PROBLEM — H53.8 BLURRING OF VISION: Status: RESOLVED | Noted: 2019-04-25 | Resolved: 2021-05-10

## 2021-05-10 PROBLEM — R29.818 SUSPECTED SLEEP APNEA: Status: RESOLVED | Noted: 2019-11-07 | Resolved: 2021-05-10

## 2021-05-10 PROCEDURE — 80053 COMPREHEN METABOLIC PANEL: CPT

## 2021-05-10 PROCEDURE — 36415 COLL VENOUS BLD VENIPUNCTURE: CPT

## 2021-05-10 PROCEDURE — 85025 COMPLETE CBC W/AUTO DIFF WBC: CPT

## 2021-05-10 PROCEDURE — 82306 VITAMIN D 25 HYDROXY: CPT

## 2021-05-10 PROCEDURE — 80061 LIPID PANEL: CPT

## 2021-05-10 PROCEDURE — G0439 PPPS, SUBSEQ VISIT: HCPCS | Performed by: INTERNAL MEDICINE

## 2021-05-10 RX ORDER — LOSARTAN POTASSIUM AND HYDROCHLOROTHIAZIDE 25; 100 MG/1; MG/1
1 TABLET ORAL DAILY
Qty: 90 TABLET | Refills: 1 | Status: SHIPPED | OUTPATIENT
Start: 2021-05-10 | End: 2022-01-26 | Stop reason: ALTCHOICE

## 2021-05-10 RX ORDER — CLOBETASOL PROPIONATE 0.05 MG/G
1 GEL TOPICAL 2 TIMES DAILY
Qty: 60 G | Refills: 3 | Status: SHIPPED | OUTPATIENT
Start: 2021-05-10

## 2021-05-10 RX ORDER — HYDRALAZINE HYDROCHLORIDE 10 MG/1
10 TABLET, FILM COATED ORAL 3 TIMES DAILY
Qty: 270 TABLET | Refills: 1 | Status: SHIPPED | OUTPATIENT
Start: 2021-05-10

## 2021-05-10 RX ORDER — ATORVASTATIN CALCIUM 10 MG/1
10 TABLET, FILM COATED ORAL NIGHTLY
Qty: 90 TABLET | Refills: 1 | Status: SHIPPED | OUTPATIENT
Start: 2021-05-10

## 2021-05-10 RX ORDER — AMLODIPINE BESYLATE 5 MG/1
5 TABLET ORAL DAILY
Qty: 90 TABLET | Refills: 1 | Status: SHIPPED | OUTPATIENT
Start: 2021-05-10

## 2021-05-10 NOTE — PROGRESS NOTES
HPI:   Romayne Gram is a 78year old male who presents for a Medicare Subsequent Annual Wellness visit (Pt already had Initial Annual Wellness).       Annual Physical due on 05/10/2022        Fall/Risk Assessment Update needed    Last Fall risk screen (94.8 kg)     Last Cholesterol Labs:   Lab Results   Component Value Date    CHOLEST 155 10/01/2020    HDL 41 10/01/2020     (H) 10/01/2020    TRIG 67 10/01/2020          Last Chemistry Labs:   Lab Results   Component Value Date    AST 13 (L) 10/01/ use. He reports that he does not use drugs.      REVIEW OF SYSTEMS:   GENERAL: feels well otherwise  SKIN: denies any unusual skin lesions  EYES: denies blurred vision or double vision  HEENT: denies nasal congestion, sinus pain or ST  LUNGS: denies shortne rhythm, S1, S2 normal, no murmur, rub or gallop   Abdomen:   Soft, non-tender, bowel sounds active all four quadrants,  no masses, no organomegaly   Genitalia: Normal male   Rectal: Normal tone, symmetrical,mildly enlarged prostate with no nodules/indurati me back if remains elevated.     (E78.2) Mixed hyperlipidemia  Plan: COMP METABOLIC PANEL (14), LIPID PANEL        Check lipid panel; continue with low chol diet and chol med.     (E55.9) Vitamin D deficiency  Plan: VITAMIN D, 25-HYDROXY        Check vit D flowsheet data found.     Prostate Cancer Screening      PSA  Annually PSA due on 10/01/2022  Update Health Maintenance if applicable     Immunizations (Update Immunization Activity if applicable)     Influenza  Covered Annually 10/1/2020   Please get every

## 2021-08-02 ENCOUNTER — OFFICE VISIT (OUTPATIENT)
Dept: INTERNAL MEDICINE CLINIC | Facility: CLINIC | Age: 76
End: 2021-08-02
Payer: MEDICARE

## 2021-08-02 ENCOUNTER — LAB ENCOUNTER (OUTPATIENT)
Dept: LAB | Age: 76
End: 2021-08-02
Attending: INTERNAL MEDICINE
Payer: MEDICARE

## 2021-08-02 ENCOUNTER — EKG ENCOUNTER (OUTPATIENT)
Dept: LAB | Age: 76
End: 2021-08-02
Attending: INTERNAL MEDICINE
Payer: MEDICARE

## 2021-08-02 VITALS
RESPIRATION RATE: 12 BRPM | BODY MASS INDEX: 40.36 KG/M2 | HEIGHT: 60.5 IN | WEIGHT: 211 LBS | DIASTOLIC BLOOD PRESSURE: 74 MMHG | SYSTOLIC BLOOD PRESSURE: 133 MMHG | TEMPERATURE: 98 F | HEART RATE: 62 BPM

## 2021-08-02 DIAGNOSIS — I10 ESSENTIAL HYPERTENSION: ICD-10-CM

## 2021-08-02 DIAGNOSIS — H25.9 AGE-RELATED CATARACT OF BOTH EYES, UNSPECIFIED AGE-RELATED CATARACT TYPE: ICD-10-CM

## 2021-08-02 DIAGNOSIS — Z01.818 PREOP GENERAL PHYSICAL EXAM: ICD-10-CM

## 2021-08-02 DIAGNOSIS — Z01.818 PREOP GENERAL PHYSICAL EXAM: Primary | ICD-10-CM

## 2021-08-02 DIAGNOSIS — E78.2 MIXED HYPERLIPIDEMIA: ICD-10-CM

## 2021-08-02 LAB
ALBUMIN SERPL-MCNC: 3.6 G/DL (ref 3.4–5)
ALBUMIN/GLOB SERPL: 1 {RATIO} (ref 1–2)
ALP LIVER SERPL-CCNC: 126 U/L
ALT SERPL-CCNC: 21 U/L
ANION GAP SERPL CALC-SCNC: 8 MMOL/L (ref 0–18)
AST SERPL-CCNC: 18 U/L (ref 15–37)
BASOPHILS # BLD AUTO: 0.06 X10(3) UL (ref 0–0.2)
BASOPHILS NFR BLD AUTO: 0.7 %
BILIRUB SERPL-MCNC: 0.5 MG/DL (ref 0.1–2)
BUN BLD-MCNC: 22 MG/DL (ref 7–18)
BUN/CREAT SERPL: 23.9 (ref 10–20)
CALCIUM BLD-MCNC: 8.5 MG/DL (ref 8.5–10.1)
CHLORIDE SERPL-SCNC: 107 MMOL/L (ref 98–112)
CO2 SERPL-SCNC: 25 MMOL/L (ref 21–32)
CREAT BLD-MCNC: 0.92 MG/DL
DEPRECATED RDW RBC AUTO: 41.9 FL (ref 35.1–46.3)
EOSINOPHIL # BLD AUTO: 0.27 X10(3) UL (ref 0–0.7)
EOSINOPHIL NFR BLD AUTO: 3.1 %
ERYTHROCYTE [DISTWIDTH] IN BLOOD BY AUTOMATED COUNT: 12.8 % (ref 11–15)
GLOBULIN PLAS-MCNC: 3.7 G/DL (ref 2.8–4.4)
GLUCOSE BLD-MCNC: 93 MG/DL (ref 70–99)
HCT VFR BLD AUTO: 40.8 %
HGB BLD-MCNC: 13.2 G/DL
IMM GRANULOCYTES # BLD AUTO: 0.03 X10(3) UL (ref 0–1)
IMM GRANULOCYTES NFR BLD: 0.3 %
LYMPHOCYTES # BLD AUTO: 1.67 X10(3) UL (ref 1–4)
LYMPHOCYTES NFR BLD AUTO: 19.1 %
M PROTEIN MFR SERPL ELPH: 7.3 G/DL (ref 6.4–8.2)
MCH RBC QN AUTO: 28.8 PG (ref 26–34)
MCHC RBC AUTO-ENTMCNC: 32.4 G/DL (ref 31–37)
MCV RBC AUTO: 89.1 FL
MONOCYTES # BLD AUTO: 0.86 X10(3) UL (ref 0.1–1)
MONOCYTES NFR BLD AUTO: 9.8 %
NEUTROPHILS # BLD AUTO: 5.86 X10 (3) UL (ref 1.5–7.7)
NEUTROPHILS # BLD AUTO: 5.86 X10(3) UL (ref 1.5–7.7)
NEUTROPHILS NFR BLD AUTO: 67 %
OSMOLALITY SERPL CALC.SUM OF ELEC: 293 MOSM/KG (ref 275–295)
PATIENT FASTING Y/N/NP: YES
PLATELET # BLD AUTO: 155 10(3)UL (ref 150–450)
POTASSIUM SERPL-SCNC: 3.9 MMOL/L (ref 3.5–5.1)
RBC # BLD AUTO: 4.58 X10(6)UL
SODIUM SERPL-SCNC: 140 MMOL/L (ref 136–145)
WBC # BLD AUTO: 8.8 X10(3) UL (ref 4–11)

## 2021-08-02 PROCEDURE — 93005 ELECTROCARDIOGRAM TRACING: CPT

## 2021-08-02 PROCEDURE — 99214 OFFICE O/P EST MOD 30 MIN: CPT | Performed by: INTERNAL MEDICINE

## 2021-08-02 PROCEDURE — 85025 COMPLETE CBC W/AUTO DIFF WBC: CPT

## 2021-08-02 PROCEDURE — 93010 ELECTROCARDIOGRAM REPORT: CPT | Performed by: INTERNAL MEDICINE

## 2021-08-02 PROCEDURE — 36415 COLL VENOUS BLD VENIPUNCTURE: CPT

## 2021-08-02 PROCEDURE — 80053 COMPREHEN METABOLIC PANEL: CPT

## 2021-08-02 NOTE — PROGRESS NOTES
HPI/Subjective:     Patient ID: Lisandra Jones is a 78year old male.     Patient presents today for preop medical clearance as requested by his ophthalmologist Dr. Lu Wall for scheduled cataract surgery to be done on August 25 for the first side and Past Medical History:   Diagnosis Date   • Asthma    • Asymptomatic gallstones    • Atherosclerosis of coronary artery    • Coronary atherosclerosis    • Essential hypertension    • Hay fever    • Hyperlipidemia    • Pulmonary nodule 2/26/201 Tenderness: There is no abdominal tenderness. There is no guarding or rebound. Genitourinary:     Penis: Normal.       Prostate: Normal.      Rectum: Normal.   Musculoskeletal:         General: Normal range of motion.       Cervical back: Normal rang

## 2021-08-03 ENCOUNTER — TELEPHONE (OUTPATIENT)
Dept: INTERNAL MEDICINE CLINIC | Facility: CLINIC | Age: 76
End: 2021-08-03

## 2021-08-24 ENCOUNTER — MED REC SCAN ONLY (OUTPATIENT)
Dept: INTERNAL MEDICINE CLINIC | Facility: CLINIC | Age: 76
End: 2021-08-24

## 2021-08-31 ENCOUNTER — PATIENT MESSAGE (OUTPATIENT)
Dept: INTERNAL MEDICINE CLINIC | Facility: CLINIC | Age: 76
End: 2021-08-31

## 2021-08-31 ENCOUNTER — TELEPHONE (OUTPATIENT)
Dept: INTERNAL MEDICINE CLINIC | Facility: CLINIC | Age: 76
End: 2021-08-31

## 2021-09-01 RX ORDER — CODEINE PHOSPHATE AND GUAIFENESIN 10; 100 MG/5ML; MG/5ML
5 SOLUTION ORAL EVERY 6 HOURS PRN
Qty: 240 ML | Refills: 0 | Status: SHIPPED | OUTPATIENT
Start: 2021-09-01

## 2021-09-01 NOTE — TELEPHONE ENCOUNTER
From: [facility]  To: Nathalie Justice MD  Sent: 8/31/2021 1:16 PM CDT  Subject: Prescription Question    Request for refill of.guaiFENesin-Codeine please to [address]. Seasonal allergic cough is beginning.  Tested negative rapid test t

## 2021-09-01 NOTE — TELEPHONE ENCOUNTER
Verified name and . Patient was advised of Dr. Chung Mckoy message as seen in previous charting note.

## 2021-09-01 NOTE — TELEPHONE ENCOUNTER
RN pls call pt and verify the need of rx refill, pls triage or offer ov if needed, thanks. From: Mirella Lozano  To:  Nai Gan MD  Sent: 8/31/2021  1:16 PM CDT  Subject: Prescription Question    Request for refill of.guaiFENesin-Code

## 2021-09-01 NOTE — TELEPHONE ENCOUNTER
See TE 8-31-21.     Future Appointments   Date Time Provider Carolann Heidi   9[facility]8/2021  7:30 AM [facility]   9/9/2021  7:45 AM Romie Stephen MD  [facility]    9/16/2021  8:30 AM Marylen Raya R, OD  [facility]    8

## 2021-09-01 NOTE — TELEPHONE ENCOUNTER
Pt reports had a scratchy throat a few days ago, went to get a rapid covid test, test was negative. Denies fever. Pt reports had runny nose only for 1 day. Pt requesting for cough medicine for allergies because of upcoming cataract surgery.  Pt states it i

## 2021-11-03 RX ORDER — LOSARTAN POTASSIUM 50 MG/1
TABLET ORAL
Qty: 180 TABLET | Refills: 1 | OUTPATIENT
Start: 2021-11-03

## 2022-02-21 ENCOUNTER — TELEPHONE (OUTPATIENT)
Dept: INTERNAL MEDICINE CLINIC | Facility: CLINIC | Age: 77
End: 2022-02-21

## 2022-02-22 RX ORDER — CODEINE PHOSPHATE AND GUAIFENESIN 10; 100 MG/5ML; MG/5ML
5 SOLUTION ORAL EVERY 6 HOURS PRN
Qty: 240 ML | Refills: 0 | OUTPATIENT
Start: 2022-02-22

## 2022-02-22 RX ORDER — CODEINE PHOSPHATE AND GUAIFENESIN 10; 100 MG/5ML; MG/5ML
5 SOLUTION ORAL EVERY 6 HOURS PRN
Qty: 240 ML | Refills: 0 | Status: SHIPPED | OUTPATIENT
Start: 2022-02-22

## 2022-02-22 NOTE — TELEPHONE ENCOUNTER
Patient states he has seasonal cough and gets guaifenesin with codeine filled about once per year. Patient states he is not sick currently but has cough at night from allergy symptoms. Patient reports the reason he is needing sooner refill (last refilled 9/1/21 #240 ml) is because his wife did not  her prescription used his, patient states you are aware of this. Patient has 3 doses left and is asking if request can be forwarded for your review. Pended for review.      Please reply to pool: FRANCHESCA Marie

## 2022-02-23 NOTE — TELEPHONE ENCOUNTER
Left a detailed message to cell (ok per JOVANNI) regarding note below.  Advised to call back as needed

## 2022-04-11 RX ORDER — AMLODIPINE BESYLATE 5 MG/1
TABLET ORAL
Qty: 90 TABLET | Refills: 1 | Status: SHIPPED | OUTPATIENT
Start: 2022-04-11

## 2022-05-18 RX ORDER — HYDRALAZINE HYDROCHLORIDE 10 MG/1
10 TABLET, FILM COATED ORAL 3 TIMES DAILY
Qty: 270 TABLET | Refills: 1 | Status: SHIPPED | OUTPATIENT
Start: 2022-05-18

## 2022-05-18 NOTE — TELEPHONE ENCOUNTER
Refill passed per [facility] protocol. Requested Prescriptions   Pending Prescriptions Disp Refills    hydrALAZINE 10 MG Oral Tab 270 tablet 1     Sig: Take 1 tablet (10 mg total) by mouth 3 (three) times daily.         Hypertensive Medications Protocol Passed - 5/18/2022  1:20 PM        Passed - CMP or BMP in past 12 months        Passed - Appointment in past 6 or next 3 months        Passed - GFR Non- > 50     Lab Results   Component Value Date    GFRNAA 82 08/02/2021                     Recent Outpatient Visits              1 month ago Bilateral pseudophakia    Ophthalmology - [address], [address] [place] [address], [address]    Office Visit    7 months ago Postsurgical state, eye    Ophthalmology - Ferny Armenta MD    Office Visit    8 months ago Postsurgical [facility], eye    Ophthalmology - [address] [facility], [address]    Office Visit    8 months ago Pseudophakia of left eye    Ophthalmology - Annel Lay MD    Office Visit    8 months ago Pseudophakia of right eye    Ophthalmology - nAnel Lay MD    Office Visit          Future Appointments         Provider Department Appt Notes    In 1 week Raven Astudillo MD [employer] [employer], [address], [place] Nothing in particular

## 2022-05-18 NOTE — TELEPHONE ENCOUNTER
Received refill request from [place]. Called patient and verified that he is taking Losartan 50 mg - 2 tablets daily. Please review pended script and sign if appropriate.

## 2022-05-19 RX ORDER — LOSARTAN POTASSIUM 50 MG/1
100 TABLET ORAL DAILY
Qty: 180 TABLET | Refills: 1 | Status: SHIPPED | OUTPATIENT
Start: 2022-05-19

## 2022-05-19 RX ORDER — ATORVASTATIN CALCIUM 10 MG/1
10 TABLET, FILM COATED ORAL NIGHTLY
Qty: 90 TABLET | Refills: 1 | Status: SHIPPED | OUTPATIENT
Start: 2022-05-19

## 2022-05-19 NOTE — TELEPHONE ENCOUNTER
See Abdoul's note belwo-per wife patient is taking losartan 50 mg twice  a day. Refill passed per [facility] protocol. Requested Prescriptions   Pending Prescriptions Disp Refills    atorvastatin 10 MG Oral Tab 90 tablet 1     Sig: Take 1 tablet (10 mg total) by mouth nightly. Cholesterol Medication Protocol Failed - 5/19/2022 12:00 AM        Failed - LDL in past 12 months        Failed - Last LDL < 130     Lab Results   Component Value Date     (H) 05/10/2021               Passed - ALT in past 12 months        Passed - Last ALT < 80       Lab Results   Component Value Date    ALT 21 08/02/2021             Passed - Appointment in past 12 or next 3 months           losartan 50 MG Oral Tab 180 tablet 1     Sig: Take 2 tablets (100 mg total) by mouth daily.         Hypertensive Medications Protocol Passed - 5/19/2022 12:00 AM        Passed - CMP or BMP in past 12 months        Passed - Appointment in past 6 or next 3 months        Passed - GFR Non- > 50     Lab Results   Component Value Date    [facility] 82 08/02/2021                        Future Appointments         Provider Department Appt Notes    In 1 week Hamzah Lopes MD [facility], [facility] Nothing in particular             Recent Outpatient Visits              1 month ago Bilateral pseudophakia    Ophthalmology - [address], [facility], [facility], [address]    Office Visit    7 months ago Postsurgical state, eye    Ophthalmology [facility], Chela Najera MD    Office Visit    8 months ago Postsurgical state, eye    Ophthalmology - [address], [facility], [address]    Office Visit    8 months ago Pseudophakia of left eye    [facility]    Office Visit    8 months ago Pseudophakia of right eye    Ophthalmology  [address] [facility], Chela Najera MD    Office Visit

## 2022-05-19 NOTE — TELEPHONE ENCOUNTER
Please review; protocol failed/no protocol. Requested Prescriptions   Pending Prescriptions Disp Refills    atorvastatin 10 MG Oral Tab 90 tablet 1     Sig: Take 1 tablet (10 mg total) by mouth nightly. Cholesterol Medication Protocol Failed - 5/19/2022 12:00 AM        Failed - LDL in past 12 months        Failed - Last LDL < 130     Lab Results   Component Value Date     (H) 05/10/2021               Passed - ALT in past 12 months        Passed - Last ALT < 80       Lab Results   Component Value Date    ALT 21 08/02/2021             Passed - Appointment in past 12 or next 3 months          Signed Prescriptions Disp Refills    losartan 50 MG Oral Tab 180 tablet 1     Sig: Take 2 tablets (100 mg total) by mouth daily.         Hypertensive Medications Protocol Passed - 5/19/2022 12:00 AM        Passed - CMP or BMP in past 12 months        Passed - Appointment in past 6 or next 3 months        Passed - GFR Non- > 50     Lab Results   Component Value Date    GFRNAA 82 08/02/2021                        Recent Outpatient Visits              1 month ago Bilateral pseudophakia    Ophthalmology - [facility], [address] [place] [address]., [address]    Office Visit    7 months ago Postsurgical state, eye    Ophthalmology - [facility], Tapan Smith MD    Office Visit    8 months ago Postsurgical state, eye    Ophthalmology - [facility], [facility], [address]    Office Visit    8 months ago Pseudophakia of left eye    Ophthalmology - Dev Hu MD    Office Visit    8 months ago Pseudophakia of right eye    Ophthalmology - Dev Hu MD    Office Visit             Future Appointments         Provider Department Appt Notes    In 1 week Sepideh Jacobsen MD [place] [facility], [facility], [address], [place] Nothing in particular

## 2022-05-27 ENCOUNTER — OFFICE VISIT (OUTPATIENT)
Dept: INTERNAL MEDICINE CLINIC | Facility: CLINIC | Age: 77
End: 2022-05-27
Payer: MEDICARE

## 2022-05-27 ENCOUNTER — LAB ENCOUNTER (OUTPATIENT)
Dept: LAB | Age: 77
End: 2022-05-27
Attending: INTERNAL MEDICINE
Payer: MEDICARE

## 2022-05-27 VITALS
WEIGHT: 204 LBS | RESPIRATION RATE: 12 BRPM | SYSTOLIC BLOOD PRESSURE: 134 MMHG | HEART RATE: 66 BPM | BODY MASS INDEX: 33.99 KG/M2 | HEIGHT: 65 IN | DIASTOLIC BLOOD PRESSURE: 78 MMHG

## 2022-05-27 DIAGNOSIS — Z12.11 COLON CANCER SCREENING: ICD-10-CM

## 2022-05-27 DIAGNOSIS — E55.9 VITAMIN D DEFICIENCY: ICD-10-CM

## 2022-05-27 DIAGNOSIS — N21.0 URINARY BLADDER STONE: ICD-10-CM

## 2022-05-27 DIAGNOSIS — Z00.00 MEDICARE ANNUAL WELLNESS VISIT, SUBSEQUENT: ICD-10-CM

## 2022-05-27 DIAGNOSIS — E66.09 CLASS 1 OBESITY DUE TO EXCESS CALORIES WITH SERIOUS COMORBIDITY AND BODY MASS INDEX (BMI) OF 33.0 TO 33.9 IN ADULT: ICD-10-CM

## 2022-05-27 DIAGNOSIS — N52.9 ERECTILE DYSFUNCTION, UNSPECIFIED ERECTILE DYSFUNCTION TYPE: ICD-10-CM

## 2022-05-27 DIAGNOSIS — Z12.5 PROSTATE CANCER SCREENING: ICD-10-CM

## 2022-05-27 DIAGNOSIS — I10 ESSENTIAL HYPERTENSION: ICD-10-CM

## 2022-05-27 DIAGNOSIS — E78.2 MIXED HYPERLIPIDEMIA: ICD-10-CM

## 2022-05-27 DIAGNOSIS — Z00.00 MEDICARE ANNUAL WELLNESS VISIT, SUBSEQUENT: Primary | ICD-10-CM

## 2022-05-27 LAB
ALBUMIN SERPL-MCNC: 3.8 G/DL (ref 3.4–5)
ALBUMIN/GLOB SERPL: 1.1 {RATIO} (ref 1–2)
ALP LIVER SERPL-CCNC: 117 U/L
ALT SERPL-CCNC: 22 U/L
ANION GAP SERPL CALC-SCNC: 5 MMOL/L (ref 0–18)
AST SERPL-CCNC: 19 U/L (ref 15–37)
BASOPHILS # BLD AUTO: 0.04 X10(3) UL (ref 0–0.2)
BASOPHILS NFR BLD AUTO: 0.5 %
BILIRUB SERPL-MCNC: 0.9 MG/DL (ref 0.1–2)
BILIRUB UR QL: NEGATIVE
BUN BLD-MCNC: 14 MG/DL (ref 7–18)
BUN/CREAT SERPL: 15.4 (ref 10–20)
CALCIUM BLD-MCNC: 8.7 MG/DL (ref 8.5–10.1)
CHLORIDE SERPL-SCNC: 108 MMOL/L (ref 98–112)
CHOLEST SERPL-MCNC: 167 MG/DL (ref ?–200)
CLARITY UR: CLEAR
CO2 SERPL-SCNC: 27 MMOL/L (ref 21–32)
COLOR UR: YELLOW
COMPLEXED PSA SERPL-MCNC: 4.18 NG/ML (ref ?–4)
CREAT BLD-MCNC: 0.91 MG/DL
DEPRECATED RDW RBC AUTO: 42.1 FL (ref 35.1–46.3)
EOSINOPHIL # BLD AUTO: 0.2 X10(3) UL (ref 0–0.7)
EOSINOPHIL NFR BLD AUTO: 2.4 %
ERYTHROCYTE [DISTWIDTH] IN BLOOD BY AUTOMATED COUNT: 12.9 % (ref 11–15)
FASTING PATIENT LIPID ANSWER: YES
FASTING STATUS PATIENT QL REPORTED: YES
GLOBULIN PLAS-MCNC: 3.6 G/DL (ref 2.8–4.4)
GLUCOSE BLD-MCNC: 89 MG/DL (ref 70–99)
GLUCOSE UR-MCNC: NEGATIVE MG/DL
HCT VFR BLD AUTO: 42.8 %
HDLC SERPL-MCNC: 40 MG/DL (ref 40–59)
HGB BLD-MCNC: 14.2 G/DL
HGB UR QL STRIP.AUTO: NEGATIVE
IMM GRANULOCYTES # BLD AUTO: 0.03 X10(3) UL (ref 0–1)
IMM GRANULOCYTES NFR BLD: 0.4 %
KETONES UR-MCNC: NEGATIVE MG/DL
LDLC SERPL CALC-MCNC: 110 MG/DL (ref ?–100)
LYMPHOCYTES # BLD AUTO: 1.45 X10(3) UL (ref 1–4)
LYMPHOCYTES NFR BLD AUTO: 17.7 %
MCH RBC QN AUTO: 29.6 PG (ref 26–34)
MCHC RBC AUTO-ENTMCNC: 33.2 G/DL (ref 31–37)
MCV RBC AUTO: 89.2 FL
MONOCYTES # BLD AUTO: 0.57 X10(3) UL (ref 0.1–1)
MONOCYTES NFR BLD AUTO: 7 %
NEUTROPHILS # BLD AUTO: 5.9 X10 (3) UL (ref 1.5–7.7)
NEUTROPHILS # BLD AUTO: 5.9 X10(3) UL (ref 1.5–7.7)
NEUTROPHILS NFR BLD AUTO: 72 %
NITRITE UR QL STRIP.AUTO: NEGATIVE
NONHDLC SERPL-MCNC: 127 MG/DL (ref ?–130)
OSMOLALITY SERPL CALC.SUM OF ELEC: 290 MOSM/KG (ref 275–295)
PH UR: 6 [PH] (ref 5–8)
PLATELET # BLD AUTO: 176 10(3)UL (ref 150–450)
POTASSIUM SERPL-SCNC: 3.8 MMOL/L (ref 3.5–5.1)
PROT SERPL-MCNC: 7.4 G/DL (ref 6.4–8.2)
PROT UR-MCNC: 30 MG/DL
RBC # BLD AUTO: 4.8 X10(6)UL
SODIUM SERPL-SCNC: 140 MMOL/L (ref 136–145)
SP GR UR STRIP: 1.02 (ref 1–1.03)
TRIGL SERPL-MCNC: 90 MG/DL (ref 30–149)
UROBILINOGEN UR STRIP-ACNC: <2
VIT C UR-MCNC: NEGATIVE MG/DL
VIT D+METAB SERPL-MCNC: 29.4 NG/ML (ref 30–100)
VLDLC SERPL CALC-MCNC: 15 MG/DL (ref 0–30)
WBC # BLD AUTO: 8.2 X10(3) UL (ref 4–11)

## 2022-05-27 PROCEDURE — G0439 PPPS, SUBSEQ VISIT: HCPCS | Performed by: INTERNAL MEDICINE

## 2022-05-27 PROCEDURE — 81001 URINALYSIS AUTO W/SCOPE: CPT

## 2022-05-27 PROCEDURE — 82306 VITAMIN D 25 HYDROXY: CPT

## 2022-05-27 PROCEDURE — 36415 COLL VENOUS BLD VENIPUNCTURE: CPT

## 2022-05-27 PROCEDURE — 85025 COMPLETE CBC W/AUTO DIFF WBC: CPT

## 2022-05-27 PROCEDURE — 80061 LIPID PANEL: CPT

## 2022-05-27 PROCEDURE — 80053 COMPREHEN METABOLIC PANEL: CPT

## 2022-05-27 RX ORDER — VARDENAFIL HYDROCHLORIDE 20 MG/1
20 TABLET ORAL AS NEEDED
Qty: 30 TABLET | Refills: 2 | Status: SHIPPED | OUTPATIENT
Start: 2022-05-27

## 2022-05-29 PROBLEM — N21.0 URINARY BLADDER STONE: Status: ACTIVE | Noted: 2022-05-29

## 2022-06-06 ENCOUNTER — TELEPHONE (OUTPATIENT)
Dept: INTERNAL MEDICINE CLINIC | Facility: CLINIC | Age: 77
End: 2022-06-06

## 2022-06-06 RX ORDER — VARDENAFIL HYDROCHLORIDE 20 MG/1
20 TABLET ORAL
Qty: 30 TABLET | Refills: 2 | OUTPATIENT
Start: 2022-06-06

## 2022-06-06 NOTE — TELEPHONE ENCOUNTER
Called [facility] and spoke with Brodie Langston, states that they do not have any record on file for the [place], and she also talked to her pharmacist, they said nobody called our office regarding this medication. Called patient, states  that he  [facility] in [place]. States that he received a text message from [facility] on Friday that it is ready for ,inmfioremd that pharmacy just called us this morning. Called [facility]'s and spoke with Cele, confirmed that they were the one who called us this morning to clarify the frequency of the medication, informed that it is once a day as needed per Dr Panda Mcnulty record updated.

## 2022-06-06 NOTE — TELEPHONE ENCOUNTER
[address] [place] [address] requesting clarification for Vardenafil 20 mg. Needs to know either max # per day, or is this daily prn.

## 2022-06-20 ENCOUNTER — HOSPITAL ENCOUNTER (OUTPATIENT)
Dept: CT IMAGING | Age: 77
Discharge: HOME | End: 2022-06-20
Attending: INTERNAL MEDICINE
Payer: MEDICARE

## 2022-06-20 DIAGNOSIS — N21.0 URINARY BLADDER STONE: ICD-10-CM

## 2022-06-20 PROCEDURE — 76377 3D RENDER W/INTRP POSTPROCES: CPT | Performed by: INTERNAL MEDICINE

## 2022-06-20 PROCEDURE — 74178 CT ABD&PLV WO CNTR FLWD CNTR: CPT | Performed by: INTERNAL MEDICINE

## 2022-06-26 ENCOUNTER — TELEPHONE (OUTPATIENT)
Dept: INTERNAL MEDICINE CLINIC | Facility: CLINIC | Age: 77
End: 2022-06-26

## 2022-06-26 DIAGNOSIS — N40.1 ENLARGED PROSTATE WITH URINARY OBSTRUCTION: Primary | ICD-10-CM

## 2022-06-26 DIAGNOSIS — N13.8 ENLARGED PROSTATE WITH URINARY OBSTRUCTION: Primary | ICD-10-CM

## 2022-07-12 RX ORDER — AMLODIPINE BESYLATE 5 MG/1
TABLET ORAL
Qty: 90 TABLET | Refills: 1 | Status: SHIPPED | OUTPATIENT
Start: 2022-07-12

## 2022-10-07 ENCOUNTER — MED REC SCAN ONLY (OUTPATIENT)
Dept: INTERNAL MEDICINE CLINIC | Facility: CLINIC | Age: 77
End: 2022-10-07

## 2022-11-20 RX ORDER — CODEINE PHOSPHATE AND GUAIFENESIN 10; 100 MG/5ML; MG/5ML
5 SOLUTION ORAL EVERY 6 HOURS PRN
Qty: 240 ML | Refills: 0 | Status: SHIPPED | OUTPATIENT
Start: 2022-11-20

## 2022-12-02 ENCOUNTER — OFFICE VISIT (OUTPATIENT)
Dept: SURGERY | Facility: CLINIC | Age: 77
End: 2022-12-02
Payer: MEDICARE

## 2022-12-02 VITALS — SYSTOLIC BLOOD PRESSURE: 200 MMHG | HEART RATE: 65 BPM | DIASTOLIC BLOOD PRESSURE: 91 MMHG

## 2022-12-02 DIAGNOSIS — R33.9 INCOMPLETE BLADDER EMPTYING: ICD-10-CM

## 2022-12-02 DIAGNOSIS — R97.20 ELEVATED PSA: ICD-10-CM

## 2022-12-02 DIAGNOSIS — N13.8 BPH WITH OBSTRUCTION/LOWER URINARY TRACT SYMPTOMS: Primary | ICD-10-CM

## 2022-12-02 DIAGNOSIS — N40.1 BPH WITH OBSTRUCTION/LOWER URINARY TRACT SYMPTOMS: Primary | ICD-10-CM

## 2022-12-02 PROCEDURE — 99204 OFFICE O/P NEW MOD 45 MIN: CPT | Performed by: UROLOGY

## 2022-12-05 RX ORDER — ATORVASTATIN CALCIUM 10 MG/1
10 TABLET, FILM COATED ORAL NIGHTLY
Qty: 90 TABLET | Refills: 1 | Status: SHIPPED | OUTPATIENT
Start: 2022-12-05

## 2022-12-05 RX ORDER — LOSARTAN POTASSIUM 50 MG/1
100 TABLET ORAL DAILY
Qty: 180 TABLET | Refills: 1 | Status: SHIPPED | OUTPATIENT
Start: 2022-12-05

## 2022-12-08 ENCOUNTER — PROCEDURE (OUTPATIENT)
Dept: SURGERY | Facility: CLINIC | Age: 77
End: 2022-12-08
Payer: MEDICARE

## 2022-12-08 VITALS — SYSTOLIC BLOOD PRESSURE: 184 MMHG | HEART RATE: 73 BPM | DIASTOLIC BLOOD PRESSURE: 89 MMHG

## 2022-12-08 DIAGNOSIS — R97.20 ELEVATED PSA: ICD-10-CM

## 2022-12-08 DIAGNOSIS — N13.8 BPH WITH OBSTRUCTION/LOWER URINARY TRACT SYMPTOMS: Primary | ICD-10-CM

## 2022-12-08 DIAGNOSIS — R33.9 INCOMPLETE BLADDER EMPTYING: ICD-10-CM

## 2022-12-08 DIAGNOSIS — N40.1 BPH WITH OBSTRUCTION/LOWER URINARY TRACT SYMPTOMS: Primary | ICD-10-CM

## 2022-12-08 PROCEDURE — 52000 CYSTOURETHROSCOPY: CPT | Performed by: UROLOGY

## 2022-12-08 PROCEDURE — 99213 OFFICE O/P EST LOW 20 MIN: CPT | Performed by: UROLOGY

## 2022-12-08 RX ORDER — CIPROFLOXACIN 500 MG/1
500 TABLET, FILM COATED ORAL ONCE
Status: COMPLETED | OUTPATIENT
Start: 2022-12-08 | End: 2022-12-08

## 2022-12-08 RX ADMIN — CIPROFLOXACIN 500 MG: 500 TABLET, FILM COATED ORAL at 13:37:00

## 2022-12-16 ENCOUNTER — OFFICE VISIT (OUTPATIENT)
Dept: SURGERY | Facility: CLINIC | Age: 77
End: 2022-12-16
Payer: MEDICARE

## 2022-12-16 VITALS
HEIGHT: 64.4 IN | OXYGEN SATURATION: 96 % | SYSTOLIC BLOOD PRESSURE: 162 MMHG | BODY MASS INDEX: 35.27 KG/M2 | DIASTOLIC BLOOD PRESSURE: 98 MMHG | HEART RATE: 72 BPM | WEIGHT: 209.13 LBS

## 2022-12-16 DIAGNOSIS — E78.2 MIXED HYPERLIPIDEMIA: ICD-10-CM

## 2022-12-16 DIAGNOSIS — I10 ESSENTIAL HYPERTENSION: Primary | ICD-10-CM

## 2022-12-16 DIAGNOSIS — E66.9 OBESITY (BMI 30-39.9): ICD-10-CM

## 2022-12-16 PROCEDURE — 99204 OFFICE O/P NEW MOD 45 MIN: CPT | Performed by: INTERNAL MEDICINE

## 2022-12-20 ENCOUNTER — EKG ENCOUNTER (OUTPATIENT)
Dept: LAB | Age: 77
End: 2022-12-20
Attending: UROLOGY
Payer: MEDICARE

## 2022-12-20 ENCOUNTER — LAB ENCOUNTER (OUTPATIENT)
Dept: LAB | Age: 77
End: 2022-12-20
Attending: UROLOGY
Payer: MEDICARE

## 2022-12-20 DIAGNOSIS — Z01.818 PREOP TESTING: ICD-10-CM

## 2022-12-20 LAB
ANION GAP SERPL CALC-SCNC: 8 MMOL/L (ref 0–18)
ATRIAL RATE: 65 BPM
BASOPHILS # BLD AUTO: 0.06 X10(3) UL (ref 0–0.2)
BASOPHILS NFR BLD AUTO: 1 %
BUN BLD-MCNC: 20 MG/DL (ref 7–18)
BUN/CREAT SERPL: 19.4 (ref 10–20)
CALCIUM BLD-MCNC: 8.8 MG/DL (ref 8.5–10.1)
CHLORIDE SERPL-SCNC: 106 MMOL/L (ref 98–112)
CO2 SERPL-SCNC: 28 MMOL/L (ref 21–32)
CREAT BLD-MCNC: 1.03 MG/DL
DEPRECATED RDW RBC AUTO: 42.2 FL (ref 35.1–46.3)
EOSINOPHIL # BLD AUTO: 0.36 X10(3) UL (ref 0–0.7)
EOSINOPHIL NFR BLD AUTO: 5.8 %
ERYTHROCYTE [DISTWIDTH] IN BLOOD BY AUTOMATED COUNT: 12.8 % (ref 11–15)
FASTING STATUS PATIENT QL REPORTED: YES
GFR SERPLBLD BASED ON 1.73 SQ M-ARVRAT: 76 ML/MIN/1.73M2 (ref 60–?)
GLUCOSE BLD-MCNC: 82 MG/DL (ref 70–99)
HCT VFR BLD AUTO: 44.3 %
HGB BLD-MCNC: 14.1 G/DL
IMM GRANULOCYTES # BLD AUTO: 0.04 X10(3) UL (ref 0–1)
IMM GRANULOCYTES NFR BLD: 0.6 %
LYMPHOCYTES # BLD AUTO: 1.58 X10(3) UL (ref 1–4)
LYMPHOCYTES NFR BLD AUTO: 25.2 %
MCH RBC QN AUTO: 28.8 PG (ref 26–34)
MCHC RBC AUTO-ENTMCNC: 31.8 G/DL (ref 31–37)
MCV RBC AUTO: 90.6 FL
MONOCYTES # BLD AUTO: 1.04 X10(3) UL (ref 0.1–1)
MONOCYTES NFR BLD AUTO: 16.6 %
NEUTROPHILS # BLD AUTO: 3.18 X10 (3) UL (ref 1.5–7.7)
NEUTROPHILS # BLD AUTO: 3.18 X10(3) UL (ref 1.5–7.7)
NEUTROPHILS NFR BLD AUTO: 50.8 %
OSMOLALITY SERPL CALC.SUM OF ELEC: 296 MOSM/KG (ref 275–295)
P AXIS: 20 DEGREES
P-R INTERVAL: 156 MS
PLATELET # BLD AUTO: 163 10(3)UL (ref 150–450)
POTASSIUM SERPL-SCNC: 4.4 MMOL/L (ref 3.5–5.1)
Q-T INTERVAL: 426 MS
QRS DURATION: 90 MS
QTC CALCULATION (BEZET): 443 MS
R AXIS: -22 DEGREES
RBC # BLD AUTO: 4.89 X10(6)UL
SODIUM SERPL-SCNC: 142 MMOL/L (ref 136–145)
T AXIS: 71 DEGREES
VENTRICULAR RATE: 65 BPM
WBC # BLD AUTO: 6.3 X10(3) UL (ref 4–11)

## 2022-12-20 PROCEDURE — 87086 URINE CULTURE/COLONY COUNT: CPT | Performed by: UROLOGY

## 2022-12-20 PROCEDURE — 85025 COMPLETE CBC W/AUTO DIFF WBC: CPT

## 2022-12-20 PROCEDURE — 93005 ELECTROCARDIOGRAM TRACING: CPT

## 2022-12-20 PROCEDURE — 80048 BASIC METABOLIC PNL TOTAL CA: CPT

## 2022-12-20 PROCEDURE — 36415 COLL VENOUS BLD VENIPUNCTURE: CPT

## 2022-12-20 PROCEDURE — 93010 ELECTROCARDIOGRAM REPORT: CPT | Performed by: STUDENT IN AN ORGANIZED HEALTH CARE EDUCATION/TRAINING PROGRAM

## 2022-12-26 ENCOUNTER — LAB ENCOUNTER (OUTPATIENT)
Dept: LAB | Age: 77
End: 2022-12-26
Attending: UROLOGY
Payer: MEDICARE

## 2022-12-26 DIAGNOSIS — Z01.818 PREOP TESTING: ICD-10-CM

## 2022-12-26 LAB — SARS-COV-2 RNA RESP QL NAA+PROBE: NOT DETECTED

## 2022-12-27 ENCOUNTER — PATIENT MESSAGE (OUTPATIENT)
Dept: INTERNAL MEDICINE CLINIC | Facility: CLINIC | Age: 77
End: 2022-12-27

## 2022-12-27 ENCOUNTER — TELEPHONE (OUTPATIENT)
Dept: INTERNAL MEDICINE CLINIC | Facility: CLINIC | Age: 77
End: 2022-12-27

## 2022-12-27 DIAGNOSIS — Z01.810 PREOP CARDIOVASCULAR EXAM: ICD-10-CM

## 2022-12-27 DIAGNOSIS — N40.1 BENIGN PROSTATIC HYPERPLASIA WITH LOWER URINARY TRACT SYMPTOMS, SYMPTOM DETAILS UNSPECIFIED: Primary | ICD-10-CM

## 2022-12-27 DIAGNOSIS — R94.31 ABNORMAL EKG: ICD-10-CM

## 2022-12-27 NOTE — TELEPHONE ENCOUNTER
Per patient he needs to see doctor for medical clearance due to he has abnormal EKG, no available appointment soon,  Please advise, Thank you.     Please reply to pool: EM CC IM FM ALG RHE

## 2022-12-27 NOTE — TELEPHONE ENCOUNTER
Spoke with KACEY GARG urology surgical scheduler and informed her that per Dr Keron Crandall pt's EKG was abnormal and will need a pre op appt with Dr Keron Crandall and surgery for 12/28/22 will need to be rescheduled. KACEY GARG verbalized understanding and will contact pt to inform of above information.

## 2022-12-27 NOTE — TELEPHONE ENCOUNTER
Dr Evelyn Hutchins when can you see pt for pre op appt, no appts available until 1/19/23, pt requesting to be seen sooner, please advise.

## 2022-12-27 NOTE — TELEPHONE ENCOUNTER
Spoke with patient informed, per  patient' surgery will need to be postponed, patient will make appointment.  Surgery change request sent

## 2022-12-28 NOTE — TELEPHONE ENCOUNTER
Spoke with pt, verified , informed pt of Dr Ira King message, provided pt with the phone number for central scheduling and appt for  at 10:30am scheduled, pt verbalized understanding.

## 2022-12-28 NOTE — TELEPHONE ENCOUNTER
I have no more opening this week; we can add him in on Jan 4, 2023 at 10:30 am.   I do want him to do stress test for cardiac clearance; order in epic

## 2022-12-29 NOTE — TELEPHONE ENCOUNTER
Yes the official reading of his previous ekg was normal and now the recent ekg being read as abnormal, cant rule out anterior infarct. That's why I ordered the stress test for him to further evaluate.

## 2022-12-31 ENCOUNTER — HOSPITAL ENCOUNTER (OUTPATIENT)
Dept: CV DIAGNOSTICS | Facility: HOSPITAL | Age: 77
Discharge: HOME | End: 2022-12-31
Attending: INTERNAL MEDICINE
Payer: MEDICARE

## 2022-12-31 ENCOUNTER — HOSPITAL ENCOUNTER (OUTPATIENT)
Dept: NUCLEAR MEDICINE | Facility: HOSPITAL | Age: 77
Discharge: HOME | End: 2022-12-31
Attending: INTERNAL MEDICINE
Payer: MEDICARE

## 2022-12-31 DIAGNOSIS — R94.31 ABNORMAL EKG: ICD-10-CM

## 2022-12-31 DIAGNOSIS — Z01.810 PREOP CARDIOVASCULAR EXAM: ICD-10-CM

## 2022-12-31 LAB
% OF MAX PREDICTED HR: 100 %
MAX DIASTOLIC BP: 78 MMHG
MAX HEART RATE: 126 BPM
MAX PREDICTED HEART RATE: 145 BPM
MAX SYSTOLIC BP: 192 MMHG
MAX WORK LOAD: 85

## 2022-12-31 PROCEDURE — 78452 HT MUSCLE IMAGE SPECT MULT: CPT | Performed by: INTERNAL MEDICINE

## 2022-12-31 PROCEDURE — 93017 CV STRESS TEST TRACING ONLY: CPT | Performed by: INTERNAL MEDICINE

## 2022-12-31 PROCEDURE — 93016 CV STRESS TEST SUPVJ ONLY: CPT | Performed by: INTERNAL MEDICINE

## 2022-12-31 PROCEDURE — 93018 CV STRESS TEST I&R ONLY: CPT | Performed by: INTERNAL MEDICINE

## 2023-01-04 ENCOUNTER — TELEPHONE (OUTPATIENT)
Dept: INTERNAL MEDICINE CLINIC | Facility: CLINIC | Age: 78
End: 2023-01-04

## 2023-01-04 ENCOUNTER — OFFICE VISIT (OUTPATIENT)
Dept: INTERNAL MEDICINE CLINIC | Facility: CLINIC | Age: 78
End: 2023-01-04
Payer: MEDICARE

## 2023-01-04 VITALS
OXYGEN SATURATION: 98 % | HEART RATE: 62 BPM | WEIGHT: 206.81 LBS | DIASTOLIC BLOOD PRESSURE: 80 MMHG | HEIGHT: 64.5 IN | BODY MASS INDEX: 34.88 KG/M2 | TEMPERATURE: 98 F | SYSTOLIC BLOOD PRESSURE: 158 MMHG

## 2023-01-04 DIAGNOSIS — Z01.818 PREOP GENERAL PHYSICAL EXAM: Primary | ICD-10-CM

## 2023-01-04 DIAGNOSIS — R33.8 BENIGN PROSTATIC HYPERPLASIA WITH URINARY RETENTION: ICD-10-CM

## 2023-01-04 DIAGNOSIS — N40.1 BENIGN PROSTATIC HYPERPLASIA WITH URINARY RETENTION: ICD-10-CM

## 2023-01-04 DIAGNOSIS — I10 ESSENTIAL HYPERTENSION: ICD-10-CM

## 2023-01-04 DIAGNOSIS — N40.1 BENIGN PROSTATIC HYPERPLASIA WITH LOWER URINARY TRACT SYMPTOMS, SYMPTOM DETAILS UNSPECIFIED: Primary | ICD-10-CM

## 2023-01-04 DIAGNOSIS — E78.2 MIXED HYPERLIPIDEMIA: ICD-10-CM

## 2023-01-04 PROCEDURE — 99214 OFFICE O/P EST MOD 30 MIN: CPT | Performed by: INTERNAL MEDICINE

## 2023-01-04 PROCEDURE — 1126F AMNT PAIN NOTED NONE PRSNT: CPT | Performed by: INTERNAL MEDICINE

## 2023-01-04 RX ORDER — CLOBETASOL PROPIONATE 0.05 MG/G
1 GEL TOPICAL 2 TIMES DAILY
Qty: 60 G | Refills: 3 | Status: SHIPPED | OUTPATIENT
Start: 2023-01-04

## 2023-01-04 RX ORDER — AMLODIPINE BESYLATE 10 MG/1
10 TABLET ORAL DAILY
Qty: 90 TABLET | Refills: 1 | Status: SHIPPED | OUTPATIENT
Start: 2023-01-04

## 2023-01-05 NOTE — TELEPHONE ENCOUNTER
Dwayne Norris    This is update with regards to patient Solomon James   I had him to nuclear stress test since he had abnormal preop ekg. His nuclear stress test was normal.   As to his hypertension, I had increased his amlodipine to 10mg daily and continue his losartan 100mg/d and his hydralazinr 10mg TID. He can proceed with his cystoscopy laser surgery for his prostate. Thanks.  Leo Yang

## 2023-01-06 ENCOUNTER — PATIENT MESSAGE (OUTPATIENT)
Dept: INTERNAL MEDICINE CLINIC | Facility: CLINIC | Age: 78
End: 2023-01-06

## 2023-01-06 NOTE — TELEPHONE ENCOUNTER
Morning  upon rescheduling patient' procedure, he stated that he can't handle general anesthesia, and asked if there was another form of anesthesia he could possibly have, please advise   thanks    Spoke with patient, rescheduled cystoscopy, green light laser vaporization of the prostate, Wednesday 01/18/2023.

## 2023-01-06 NOTE — TELEPHONE ENCOUNTER
Thanks for the update Farhat Net. Angie Pritchett can we get him rescheduled for his surgery. I think you should still have the form.   Thanks

## 2023-01-08 ENCOUNTER — PATIENT MESSAGE (OUTPATIENT)
Dept: INTERNAL MEDICINE CLINIC | Facility: CLINIC | Age: 78
End: 2023-01-08

## 2023-01-08 NOTE — TELEPHONE ENCOUNTER
From: [facility]  To: Andreas Gusman MD  Sent: 1/6/2023 2:46 PM CST  Subject: Visit Follow-up Question    Dr. James Espinal, I'm a little confused re my Amlodipine dossage. I am presently taking two Losartin 50 mg tabs in AM. I just picked up Amlodipine 10mg. Do you want me to take just one 10mg tab in the evening rather than the 5mg I have been taking and continue to monitor my BP ? Also I don't presently need an inhaler but would like a refill on FluticasonePropionate Nasal Spray. I must have miscommunicated my needs. Nothing urgent. I know you are busy. Green Light laser on the 18 th.  Thanks, Humberto Berrios

## 2023-01-09 RX ORDER — FLUTICASONE PROPIONATE 50 MCG
2 SPRAY, SUSPENSION (ML) NASAL DAILY
Qty: 3 EACH | Refills: 1 | Status: SHIPPED | OUTPATIENT
Start: 2023-01-09

## 2023-01-09 NOTE — TELEPHONE ENCOUNTER
Key Chaparro,  He should discuss this with his anesthesiologist on the day of surgery. I know sometimes they do perform spinal anesthetics in place of general anesthesia but if there is a specific reaction he had previously it may be related to medications he was administered and those can be changed to eliminate those types of side effects. We should proceed with surgery as scheduled.

## 2023-01-09 NOTE — TELEPHONE ENCOUNTER
Dr. Izabella Hand, please review and advise. Nasal spray not listed on med list. Medication pended, please review if appropriate. If approved, please remove inhaler from med list. Thanks.

## 2023-01-09 NOTE — TELEPHONE ENCOUNTER
Albertine Boxer, RN 1/9/2023 10:21 AM CST        ----- Message -----  From: Sasha Lozano  Sent: 1/8/2023 1:03 PM CST  To: Em Rn Triage  Subject: Visit Follow-up Question     Thank you April for clarifying dosages. I refused the inhaler at [place]. What I really wanted was the nasal spray. Thanks again.  Fred Lomeli

## 2023-01-16 ENCOUNTER — LAB ENCOUNTER (OUTPATIENT)
Dept: LAB | Age: 78
End: 2023-01-16
Attending: UROLOGY
Payer: MEDICARE

## 2023-01-16 DIAGNOSIS — Z01.818 PREOP TESTING: ICD-10-CM

## 2023-01-16 LAB — SARS-COV-2 RNA RESP QL NAA+PROBE: NOT DETECTED

## 2023-01-16 RX ORDER — SODIUM CHLORIDE, SODIUM LACTATE, POTASSIUM CHLORIDE, CALCIUM CHLORIDE 600; 310; 30; 20 MG/100ML; MG/100ML; MG/100ML; MG/100ML
INJECTION, SOLUTION INTRAVENOUS CONTINUOUS
Status: CANCELLED | OUTPATIENT
Start: 2023-01-16

## 2023-01-18 ENCOUNTER — ANESTHESIA EVENT (OUTPATIENT)
Dept: SURGERY | Facility: HOSPITAL | Age: 78
End: 2023-01-18
Payer: MEDICARE

## 2023-01-18 ENCOUNTER — TELEPHONE (OUTPATIENT)
Dept: SURGERY | Facility: CLINIC | Age: 78
End: 2023-01-18

## 2023-01-18 ENCOUNTER — ANESTHESIA (OUTPATIENT)
Dept: SURGERY | Facility: HOSPITAL | Age: 78
End: 2023-01-18
Payer: MEDICARE

## 2023-01-18 ENCOUNTER — HOSPITAL ENCOUNTER (OUTPATIENT)
Facility: HOSPITAL | Age: 78
Setting detail: HOSPITAL OUTPATIENT SURGERY
Discharge: HOME | End: 2023-01-18
Attending: UROLOGY | Admitting: UROLOGY
Payer: MEDICARE

## 2023-01-18 VITALS
OXYGEN SATURATION: 99 % | DIASTOLIC BLOOD PRESSURE: 78 MMHG | SYSTOLIC BLOOD PRESSURE: 151 MMHG | HEART RATE: 60 BPM | RESPIRATION RATE: 12 BRPM | TEMPERATURE: 98 F | HEIGHT: 64.5 IN | WEIGHT: 203 LBS | BODY MASS INDEX: 34.24 KG/M2

## 2023-01-18 DIAGNOSIS — Z01.818 PREOP TESTING: Primary | ICD-10-CM

## 2023-01-18 PROCEDURE — 52648 LASER SURGERY OF PROSTATE: CPT | Performed by: UROLOGY

## 2023-01-18 PROCEDURE — 0V508ZZ DESTRUCTION OF PROSTATE, VIA NATURAL OR ARTIFICIAL OPENING ENDOSCOPIC: ICD-10-PCS | Performed by: UROLOGY

## 2023-01-18 RX ORDER — HYDROMORPHONE HYDROCHLORIDE 1 MG/ML
0.2 INJECTION, SOLUTION INTRAMUSCULAR; INTRAVENOUS; SUBCUTANEOUS EVERY 5 MIN PRN
Status: DISCONTINUED | OUTPATIENT
Start: 2023-01-18 | End: 2023-01-18

## 2023-01-18 RX ORDER — MORPHINE SULFATE 10 MG/ML
6 INJECTION, SOLUTION INTRAMUSCULAR; INTRAVENOUS EVERY 10 MIN PRN
Status: DISCONTINUED | OUTPATIENT
Start: 2023-01-18 | End: 2023-01-18

## 2023-01-18 RX ORDER — LIDOCAINE HYDROCHLORIDE 10 MG/ML
INJECTION, SOLUTION EPIDURAL; INFILTRATION; INTRACAUDAL; PERINEURAL AS NEEDED
Status: DISCONTINUED | OUTPATIENT
Start: 2023-01-18 | End: 2023-01-18 | Stop reason: SURG

## 2023-01-18 RX ORDER — EPHEDRINE SULFATE 50 MG/ML
INJECTION INTRAVENOUS AS NEEDED
Status: DISCONTINUED | OUTPATIENT
Start: 2023-01-18 | End: 2023-01-18 | Stop reason: SURG

## 2023-01-18 RX ORDER — PHENAZOPYRIDINE HYDROCHLORIDE 200 MG/1
200 TABLET, FILM COATED ORAL ONCE
Status: DISCONTINUED | OUTPATIENT
Start: 2023-01-18 | End: 2023-01-18

## 2023-01-18 RX ORDER — MORPHINE SULFATE 4 MG/ML
2 INJECTION, SOLUTION INTRAMUSCULAR; INTRAVENOUS EVERY 10 MIN PRN
Status: DISCONTINUED | OUTPATIENT
Start: 2023-01-18 | End: 2023-01-18

## 2023-01-18 RX ORDER — HYDROMORPHONE HYDROCHLORIDE 1 MG/ML
0.4 INJECTION, SOLUTION INTRAMUSCULAR; INTRAVENOUS; SUBCUTANEOUS EVERY 5 MIN PRN
Status: DISCONTINUED | OUTPATIENT
Start: 2023-01-18 | End: 2023-01-18

## 2023-01-18 RX ORDER — FAMOTIDINE 20 MG/1
20 TABLET, FILM COATED ORAL ONCE
Status: COMPLETED | OUTPATIENT
Start: 2023-01-18 | End: 2023-01-18

## 2023-01-18 RX ORDER — SODIUM CHLORIDE 9 MG/ML
INJECTION, SOLUTION INTRAVENOUS CONTINUOUS
Status: DISCONTINUED | OUTPATIENT
Start: 2023-01-18 | End: 2023-01-18

## 2023-01-18 RX ORDER — ONDANSETRON 2 MG/ML
INJECTION INTRAMUSCULAR; INTRAVENOUS AS NEEDED
Status: DISCONTINUED | OUTPATIENT
Start: 2023-01-18 | End: 2023-01-18 | Stop reason: SURG

## 2023-01-18 RX ORDER — NALOXONE HYDROCHLORIDE 0.4 MG/ML
80 INJECTION, SOLUTION INTRAMUSCULAR; INTRAVENOUS; SUBCUTANEOUS AS NEEDED
Status: DISCONTINUED | OUTPATIENT
Start: 2023-01-18 | End: 2023-01-18

## 2023-01-18 RX ORDER — PHENAZOPYRIDINE HYDROCHLORIDE 200 MG/1
200 TABLET, FILM COATED ORAL 3 TIMES DAILY PRN
Qty: 10 TABLET | Refills: 0 | Status: SHIPPED | OUTPATIENT
Start: 2023-01-18

## 2023-01-18 RX ORDER — HYDROMORPHONE HYDROCHLORIDE 1 MG/ML
0.6 INJECTION, SOLUTION INTRAMUSCULAR; INTRAVENOUS; SUBCUTANEOUS EVERY 5 MIN PRN
Status: DISCONTINUED | OUTPATIENT
Start: 2023-01-18 | End: 2023-01-18

## 2023-01-18 RX ORDER — DEXAMETHASONE SODIUM PHOSPHATE 4 MG/ML
VIAL (ML) INJECTION AS NEEDED
Status: DISCONTINUED | OUTPATIENT
Start: 2023-01-18 | End: 2023-01-18 | Stop reason: SURG

## 2023-01-18 RX ORDER — CEFADROXIL 500 MG/1
500 CAPSULE ORAL 2 TIMES DAILY
Qty: 6 CAPSULE | Refills: 0 | Status: SHIPPED | OUTPATIENT
Start: 2023-01-19 | End: 2023-01-22

## 2023-01-18 RX ORDER — LIDOCAINE HYDROCHLORIDE 20 MG/ML
JELLY TOPICAL AS NEEDED
Status: DISCONTINUED | OUTPATIENT
Start: 2023-01-18 | End: 2023-01-18 | Stop reason: HOSPADM

## 2023-01-18 RX ORDER — METOCLOPRAMIDE 10 MG/1
10 TABLET ORAL ONCE
Status: COMPLETED | OUTPATIENT
Start: 2023-01-18 | End: 2023-01-18

## 2023-01-18 RX ORDER — ACETAMINOPHEN 500 MG
1000 TABLET ORAL ONCE
Status: COMPLETED | OUTPATIENT
Start: 2023-01-18 | End: 2023-01-18

## 2023-01-18 RX ORDER — MORPHINE SULFATE 4 MG/ML
4 INJECTION, SOLUTION INTRAMUSCULAR; INTRAVENOUS EVERY 10 MIN PRN
Status: DISCONTINUED | OUTPATIENT
Start: 2023-01-18 | End: 2023-01-18

## 2023-01-18 RX ORDER — SODIUM CHLORIDE, SODIUM LACTATE, POTASSIUM CHLORIDE, CALCIUM CHLORIDE 600; 310; 30; 20 MG/100ML; MG/100ML; MG/100ML; MG/100ML
INJECTION, SOLUTION INTRAVENOUS CONTINUOUS
Status: DISCONTINUED | OUTPATIENT
Start: 2023-01-18 | End: 2023-01-18

## 2023-01-18 RX ORDER — SODIUM CHLORIDE, SODIUM LACTATE, POTASSIUM CHLORIDE, CALCIUM CHLORIDE 600; 310; 30; 20 MG/100ML; MG/100ML; MG/100ML; MG/100ML
INJECTION, SOLUTION INTRAVENOUS CONTINUOUS PRN
Status: DISCONTINUED | OUTPATIENT
Start: 2023-01-18 | End: 2023-01-18 | Stop reason: SURG

## 2023-01-18 RX ADMIN — DEXAMETHASONE SODIUM PHOSPHATE 8 MG: 4 MG/ML VIAL (ML) INJECTION at 13:14:00

## 2023-01-18 RX ADMIN — SODIUM CHLORIDE, SODIUM LACTATE, POTASSIUM CHLORIDE, CALCIUM CHLORIDE: 600; 310; 30; 20 INJECTION, SOLUTION INTRAVENOUS at 14:30:00

## 2023-01-18 RX ADMIN — SODIUM CHLORIDE, SODIUM LACTATE, POTASSIUM CHLORIDE, CALCIUM CHLORIDE: 600; 310; 30; 20 INJECTION, SOLUTION INTRAVENOUS at 13:10:00

## 2023-01-18 RX ADMIN — EPHEDRINE SULFATE 15 MG: 50 INJECTION INTRAVENOUS at 13:27:00

## 2023-01-18 RX ADMIN — LIDOCAINE HYDROCHLORIDE 50 MG: 10 INJECTION, SOLUTION EPIDURAL; INFILTRATION; INTRACAUDAL; PERINEURAL at 13:14:00

## 2023-01-18 RX ADMIN — ONDANSETRON 4 MG: 2 INJECTION INTRAMUSCULAR; INTRAVENOUS at 13:14:00

## 2023-01-18 NOTE — INTERVAL H&P NOTE
Pre-op Diagnosis: Benign prostatic hyperplasia with lower urinary tract symptoms, symptom details unspecified [N40.1]    The above referenced H&P was reviewed by Dena Churchill MD on 1/18/2023, the patient was examined and no significant changes have occurred in the patient's condition since the H&P was performed. I discussed with the patient and/or legal representative the potential benefits, risks and side effects of this procedure; the likelihood of the patient achieving goals; and potential problems that might occur during recuperation. I discussed reasonable alternatives to the procedure, including risks, benefits and side effects related to the alternatives and risks related to not receiving this procedure. We will proceed with procedure as planned.

## 2023-01-18 NOTE — OPERATIVE REPORT
[facility]    Operative Note     Linda Spicer Location: OR   EMANUEL [id number] MRN [id number]   Admission Date 1/18/2023 Operation Date 1/18/2023   Attending Physician Jarret Hong MD Operating Physician Camila Amaral MD      Preoperative Diagnosis: Benign prostatic hyperplasia with lower urinary tract symptoms, symptom details unspecified [N40.1]     Postoperative Diagnosis: Benign prostatic hyperplasia with lower urinary tract symptoms, symptom details unspecified [N40.1]     Procedure Performed:   Cystoscopy, green light laser vaporization of the prostate     Primary Surgeon: Camila Amaral MD      Assistant: none     Surgical Findings: Trilobar prostate enlargement with a very large median lobe that is mostly the site of obstruction. There are smaller lateral lobes that have been pushed out distally closer to the Pallavi montanum due to the size of the median lobe. The bladder otherwise demonstrates grade 2-3 bladder wall trabeculation. No suspicious masses or erythema was seen. Ureter orifice normal in location bilaterally. Anesthesia: General     Complications: None     Implants: * No implants in log *     Specimen: None     Drains: 20 Tanzanian three-way Miranda catheter with a 30 cc balloon     Condition: Stable     Estimated Blood Loss: No data recorded     Summary of Case: After consent was obtained and preoperative antibiotics administered the patient was brought into the operating room. Anesthesia was administered and he was placed in the dorsolithotomy position. The groin was prepped and draped in the usual sterile standard fashion. A 24 Tanzanian laser cystoscope continuous-flow was then placed per urethra. The visualizing obturator was removed and the laser bridge was placed. The XPS greenlight laser was then placed per urethra. Findings are reported in the findings section of this report.     I vaporized the prostate starting with a median lobe at the bladder neck back to the level of the Pallavi montanum/prostatic floor. The lateral lobes were vaporized next starting on the patient's left side and then on the right side. A total of [id number] J were used at a setting variably between 100 W to 140 W. Total vaporization time 37 minutes and 19 seconds. At the completion of the procedure I reinspected the ureter orifice and they appear to be in location and appearance without evidence of any injury. The bladder was emptied the cystoscope was removed. A 20 Lithuanian three-way Miranda catheter was placed 30 cc instilled into the balloon and the patient was placed in the supine position and started on continuous bladder irrigation. I was present and performed the entirety of this procedure. There were no perioperative or immediate postoperative complications.      Gypsy Harley MD  1/18/2023  2:41 PM

## 2023-01-18 NOTE — TELEPHONE ENCOUNTER
Urology staff this patient needs to see me on Monday to remove his Miranda catheter. Please call the patient tomorrow or Friday with an appointment.

## 2023-01-18 NOTE — ANESTHESIA POSTPROCEDURE EVALUATION
Patient: Tammie Lozano    Procedure Summary     Date: 01/18/23 Room / Location: [facility] MAIN OR  / [facility] MAIN OR    Anesthesia Start: 1310 Anesthesia Stop: 3709    Procedure: Cystoscopy, green light laser vaporization of the prostate (Bladder) Diagnosis:       Benign prostatic hyperplasia with lower urinary tract symptoms, symptom details unspecified      (Benign prostatic hyperplasia with lower urinary tract symptoms, symptom details unspecified [N40.1])    Surgeons:  Marisol Chavez MD Anesthesiologist: Edgardo Gutierrez MD    Anesthesia Type: general ASA Status: 2          Anesthesia Type: general    Vitals Value Taken Time   /77 01/18/23 1445   Temp 97.2 01/18/23 1445   Pulse 64 01/18/23 1445   Resp 16 01/18/23 1445   SpO2 98 01/18/23 1445       [facility] Post Evaluation:   Patient Evaluated in PACU  Patient Participation: complete - patient participated  Level of Consciousness: awake  Pain Score: 0  Pain Management: adequate  Airway Patency:patent  Dental exam unchanged from preop  Yes    Cardiovascular Status: acceptable  Respiratory Status: acceptable  Postoperative Hydration acceptable      Jin Ho CRNA  1/18/2023 2:45 PM

## 2023-01-18 NOTE — ANESTHESIA PROCEDURE NOTES
Airway  Date/Time: 1/18/2023 1:15 PM  Urgency: Elective    Airway not difficult    General Information and Staff    Patient location during procedure: OR  Anesthesiologist: Verna Cunningham MD  Resident/CRNA: Santi Moreno CRNA  Performed: CRNA     Indications and Patient Condition  Indications for airway management: anesthesia  Sedation level: deep  Preoxygenated: yes  Patient position: sniffing  Mask difficulty assessment: 1 - vent by mask    Final Airway Details  Final airway type: supraglottic airway      Successful airway: classic  Size 4       Number of attempts at approach: 1  Number of other approaches attempted: 0

## 2023-01-19 NOTE — TELEPHONE ENCOUNTER
Spoke with Patient and informed him of Dr. Arti Kwan below Pt will be schedule to come in to see  on 1/23/2023 @10am

## 2023-01-20 NOTE — TELEPHONE ENCOUNTER
I s/w pt and told him that we need him to get enough of that abx in his system before we try removing the crouch cath and JAN may want to do a voiding trial which need to be done in the morning.

## 2023-01-23 ENCOUNTER — HOSPITAL ENCOUNTER (EMERGENCY)
Facility: HOSPITAL | Age: 78
Discharge: HOME | End: 2023-01-23
Attending: EMERGENCY MEDICINE
Payer: MEDICARE

## 2023-01-23 ENCOUNTER — OFFICE VISIT (OUTPATIENT)
Dept: SURGERY | Facility: CLINIC | Age: 78
End: 2023-01-23

## 2023-01-23 VITALS
RESPIRATION RATE: 18 BRPM | HEIGHT: 65 IN | HEART RATE: 84 BPM | WEIGHT: 198 LBS | BODY MASS INDEX: 32.99 KG/M2 | DIASTOLIC BLOOD PRESSURE: 89 MMHG | TEMPERATURE: 98 F | OXYGEN SATURATION: 97 % | SYSTOLIC BLOOD PRESSURE: 172 MMHG

## 2023-01-23 DIAGNOSIS — N40.1 BPH WITH OBSTRUCTION/LOWER URINARY TRACT SYMPTOMS: Primary | ICD-10-CM

## 2023-01-23 DIAGNOSIS — R33.9 INCOMPLETE BLADDER EMPTYING: ICD-10-CM

## 2023-01-23 DIAGNOSIS — N13.8 BPH WITH OBSTRUCTION/LOWER URINARY TRACT SYMPTOMS: Primary | ICD-10-CM

## 2023-01-23 DIAGNOSIS — R33.9 URINARY RETENTION: Primary | ICD-10-CM

## 2023-01-23 DIAGNOSIS — R97.20 ELEVATED PSA: ICD-10-CM

## 2023-01-23 LAB
BILIRUB UR QL: NEGATIVE
COLOR UR: YELLOW
GLUCOSE UR-MCNC: NEGATIVE MG/DL
KETONES UR-MCNC: NEGATIVE MG/DL
NITRITE UR QL STRIP.AUTO: NEGATIVE
PH UR: 6 [PH] (ref 5–8)
PROT UR-MCNC: 100 MG/DL
RBC #/AREA URNS AUTO: >10 /HPF
SP GR UR STRIP: 1.02 (ref 1–1.03)
UROBILINOGEN UR STRIP-ACNC: <2
VIT C UR-MCNC: NEGATIVE MG/DL

## 2023-01-23 PROCEDURE — 99024 POSTOP FOLLOW-UP VISIT: CPT | Performed by: UROLOGY

## 2023-01-23 PROCEDURE — 51702 INSERT TEMP BLADDER CATH: CPT

## 2023-01-23 PROCEDURE — 99283 EMERGENCY DEPT VISIT LOW MDM: CPT

## 2023-01-23 PROCEDURE — 87086 URINE CULTURE/COLONY COUNT: CPT | Performed by: EMERGENCY MEDICINE

## 2023-01-23 PROCEDURE — 81001 URINALYSIS AUTO W/SCOPE: CPT | Performed by: EMERGENCY MEDICINE

## 2023-01-23 PROCEDURE — 99284 EMERGENCY DEPT VISIT MOD MDM: CPT

## 2023-01-23 RX ORDER — LIDOCAINE HYDROCHLORIDE 20 MG/ML
10 JELLY TOPICAL ONCE
Status: COMPLETED | OUTPATIENT
Start: 2023-01-23 | End: 2023-01-23

## 2023-01-24 ENCOUNTER — PATIENT MESSAGE (OUTPATIENT)
Dept: SURGERY | Facility: CLINIC | Age: 78
End: 2023-01-24

## 2023-01-24 ENCOUNTER — TELEPHONE (OUTPATIENT)
Dept: SURGERY | Facility: CLINIC | Age: 78
End: 2023-01-24

## 2023-01-24 NOTE — TELEPHONE ENCOUNTER
Pt called stating pt had an appointment yesterday 1-23-23 removed a catheter. Pt was unable to urinate. went to the emergency room last night. Placed a catheter. What is the next step.   Please call

## 2023-01-24 NOTE — ED INITIAL ASSESSMENT (HPI)
Patient here for urinary retention. Had his crouch catheter removed at noon today and has not been able to urinate since. Has the sensation to urinate but is unable to.   1/18/23 pt had outpatient procedure on the prostate done.

## 2023-01-27 ENCOUNTER — NURSE ONLY (OUTPATIENT)
Dept: SURGERY | Facility: CLINIC | Age: 78
End: 2023-01-27

## 2023-01-27 ENCOUNTER — TELEPHONE (OUTPATIENT)
Dept: SURGERY | Facility: CLINIC | Age: 78
End: 2023-01-27

## 2023-01-27 VITALS — DIASTOLIC BLOOD PRESSURE: 89 MMHG | HEART RATE: 76 BPM | RESPIRATION RATE: 16 BRPM | SYSTOLIC BLOOD PRESSURE: 182 MMHG

## 2023-01-27 DIAGNOSIS — R33.9 URINARY RETENTION: Primary | ICD-10-CM

## 2023-01-27 PROCEDURE — 99024 POSTOP FOLLOW-UP VISIT: CPT | Performed by: UROLOGY

## 2023-01-27 NOTE — PROGRESS NOTES
I called the pt into the exam room and introduced myself and verified the spelling of his last name and his . I explained that K[facility] gave orders to conduct a fill/decath and then teach CIC. Pt agreed to proceed. I then assisted the pt onto the exam table and also to lower his bottom clothing to his ankles. I then placed him on the exam table in a supine position. I proceeded to detach the leg bag and extension tubing from the crouch cath and I discarded them. I then removed the crouch cath from the STAT lock at his upper inner Lt. Thigh and I then removed the STAT lock also. I then deflated the crouch cath balloon of its contents and I cleansed the end of the crouch cath with a couple of alcohol wipes and then inserted the barrel of a raymond syringe into the crouch cath. I then proceeded to instill 70 ml of 0.9 NS and I then removed the crouch cath and pt tolerated it well. I then proceeded to explain in detail what is involved in CIC and I also explained how to use all of the different caths that [employer] provides. I used body language the picture booklet that [employer] has in the sample bags. I stressed the importance of as sterile an environment as possible and to clean his penis and hands throroughly. I also told pt that if he should accidentally contaminate his cath he will need to throw it away and get a new one. I explained that [employer] will bill his insurance and will ship his supplies to his home but he will need to contact them for refills when his supplies get low. I then asked pt to perform a self cath and he was able to do this with some verbal cues. He struggled to get a good flow from the cath and had to go in and out quite a few times because only a small dribble would come out each time. Because I only instilled 70 ml and pt had a crouch cath when he came in I didn't feel the need to persist but I told pt that if he should have any trouble this evening he should let me know.  I also gave pt a voiding diary sheet and asked him to keep a log of his output in the morning and bedtime and to always try to urinate naturally first before placing the cath. I told pt that if he sees that his natural voiding is very small and dribbling he should do the CIC 2 times daily and if the amt is decent then he can do it nightly. Pt verbalized understanding and compliance.

## 2023-01-27 NOTE — TELEPHONE ENCOUNTER
I s/w JAN to clarify instructions for the N/V this morning since pt was sending a my chart msg about how long he will need to perform CIC. He gave verbal orders to have pt use a 14 FR coude tip cath 2 times daily if he is not urinating much naturally and if he is getting a decent amt of urine out naturally then he should do CIC nightly indefinitely for the treatment of his incomplete bladder emptying and urinary retention.

## 2023-01-27 NOTE — TELEPHONE ENCOUNTER
I sent pt's CIC script thru the [employer] portal along with my N/V note and also JAN's LOV note for pt's CIC to be done twice daily with a 14 FR coude tip cath indefinitely for the [address] of his urinary retention because he was unable to pass a straight cath, needs a coude tip cath.

## 2023-02-10 ENCOUNTER — OFFICE VISIT (OUTPATIENT)
Dept: SURGERY | Facility: CLINIC | Age: 78
End: 2023-02-10

## 2023-02-10 DIAGNOSIS — R33.9 URINARY RETENTION: Primary | ICD-10-CM

## 2023-02-10 DIAGNOSIS — N13.8 BPH WITH OBSTRUCTION/LOWER URINARY TRACT SYMPTOMS: ICD-10-CM

## 2023-02-10 DIAGNOSIS — N40.1 BPH WITH OBSTRUCTION/LOWER URINARY TRACT SYMPTOMS: ICD-10-CM

## 2023-02-10 DIAGNOSIS — R97.20 ELEVATED PSA: ICD-10-CM

## 2023-02-10 PROCEDURE — 99024 POSTOP FOLLOW-UP VISIT: CPT | Performed by: UROLOGY

## 2023-02-22 ENCOUNTER — OFFICE VISIT (OUTPATIENT)
Dept: SURGERY | Facility: CLINIC | Age: 78
End: 2023-02-22
Payer: MEDICARE

## 2023-02-22 VITALS
DIASTOLIC BLOOD PRESSURE: 72 MMHG | WEIGHT: 198 LBS | BODY MASS INDEX: 33.39 KG/M2 | HEART RATE: 63 BPM | HEIGHT: 64.4 IN | OXYGEN SATURATION: 97 % | SYSTOLIC BLOOD PRESSURE: 135 MMHG

## 2023-02-22 DIAGNOSIS — E66.9 OBESITY (BMI 30-39.9): ICD-10-CM

## 2023-02-22 DIAGNOSIS — I10 ESSENTIAL HYPERTENSION: Primary | ICD-10-CM

## 2023-02-22 DIAGNOSIS — E78.2 MIXED HYPERLIPIDEMIA: ICD-10-CM

## 2023-02-22 PROCEDURE — 99214 OFFICE O/P EST MOD 30 MIN: CPT | Performed by: INTERNAL MEDICINE

## 2023-05-02 ENCOUNTER — NURSE TRIAGE (OUTPATIENT)
Dept: INTERNAL MEDICINE CLINIC | Facility: CLINIC | Age: 78
End: 2023-05-02

## 2023-05-03 ENCOUNTER — OFFICE VISIT (OUTPATIENT)
Dept: INTERNAL MEDICINE CLINIC | Facility: CLINIC | Age: 78
End: 2023-05-03

## 2023-05-03 ENCOUNTER — HOSPITAL ENCOUNTER (OUTPATIENT)
Dept: GENERAL RADIOLOGY | Age: 78
Discharge: HOME | End: 2023-05-03
Attending: INTERNAL MEDICINE
Payer: MEDICARE

## 2023-05-03 VITALS
HEIGHT: 64.4 IN | OXYGEN SATURATION: 97 % | BODY MASS INDEX: 34.55 KG/M2 | SYSTOLIC BLOOD PRESSURE: 134 MMHG | TEMPERATURE: 99 F | DIASTOLIC BLOOD PRESSURE: 70 MMHG | WEIGHT: 204.88 LBS | HEART RATE: 73 BPM

## 2023-05-03 DIAGNOSIS — M25.561 CHRONIC PAIN OF BOTH KNEES: Primary | ICD-10-CM

## 2023-05-03 DIAGNOSIS — M25.562 CHRONIC PAIN OF BOTH KNEES: ICD-10-CM

## 2023-05-03 DIAGNOSIS — G89.29 CHRONIC PAIN OF BOTH KNEES: Primary | ICD-10-CM

## 2023-05-03 DIAGNOSIS — R60.0 BILATERAL LEG EDEMA: ICD-10-CM

## 2023-05-03 DIAGNOSIS — M25.561 CHRONIC PAIN OF BOTH KNEES: ICD-10-CM

## 2023-05-03 DIAGNOSIS — G89.29 CHRONIC PAIN OF BOTH KNEES: ICD-10-CM

## 2023-05-03 DIAGNOSIS — M25.562 CHRONIC PAIN OF BOTH KNEES: Primary | ICD-10-CM

## 2023-05-03 DIAGNOSIS — I10 ESSENTIAL HYPERTENSION: ICD-10-CM

## 2023-05-03 PROCEDURE — 73564 X-RAY EXAM KNEE 4 OR MORE: CPT | Performed by: INTERNAL MEDICINE

## 2023-05-03 PROCEDURE — 1125F AMNT PAIN NOTED PAIN PRSNT: CPT | Performed by: INTERNAL MEDICINE

## 2023-05-03 PROCEDURE — 99213 OFFICE O/P EST LOW 20 MIN: CPT | Performed by: INTERNAL MEDICINE

## 2023-05-03 RX ORDER — NAPROXEN 500 MG/1
TABLET ORAL
COMMUNITY
Start: 2023-04-29

## 2023-05-05 ENCOUNTER — TELEPHONE (OUTPATIENT)
Dept: INTERNAL MEDICINE CLINIC | Facility: CLINIC | Age: 78
End: 2023-05-05

## 2023-05-05 DIAGNOSIS — I73.9 PAD (PERIPHERAL ARTERY DISEASE) (HCC): Primary | ICD-10-CM

## 2023-05-15 RX ORDER — LOSARTAN POTASSIUM 50 MG/1
100 TABLET ORAL DAILY
Qty: 180 TABLET | Refills: 3 | Status: SHIPPED | OUTPATIENT
Start: 2023-05-15

## 2023-05-16 NOTE — TELEPHONE ENCOUNTER
Refill passed per [facility] protocol.      Requested Prescriptions   Pending Prescriptions Disp Refills    LOSARTAN 50 MG Oral Tab [Pharmacy Med Name: LOSARTAN 50MG TABLETS] 180 tablet 1     Sig: TAKE 2 TABLETS(100 MG) BY MOUTH DAILY       Hypertensive Medications Protocol Passed - 5/15/2023  8:04 AM        Passed - In person appointment in the past 12 or next 3 months     Recent Outpatient Visits              1 week ago Chronic pain of both knees    [address], [address], Anish Colvin MD    Office Visit    2 months ago Essential hypertension    [address] [facility], Nuria Bradley MD    Office Visit    3 months ago Urinary retention    [address], [address], Ada Hi MD    Office Visit    3 months ago Urinary retention    [address], [address], [place]    Nurse Only    3 months ago BPH with obstruction/lower urinary tract symptoms    [facility], [address], Jessica Sow MD    Office Visit          Future Appointments         Provider Department Appt Notes    In 4 weeks Claritza Harkins MD [address], [place]st px 7/75/63 Policy advised    In 1 month Gonzalez Mcqueen MD [facility], [address], [place] MEDICARE  NON SX F/U               Passed - Last BP reading less than 140/90     BP Readings from Last 1 Encounters:  05/03/23 : 134/70                Passed - CMP or BMP in past 6 months     Recent Results (from the past 4392 hour(s))   BASIC METABOLIC PANEL (8)    Collection Time: 12/20/22  8:10 AM   Result Value Ref Range    Glucose 82 70 - 99 mg/dL    Sodium 142 136 - 145 mmol/L    Potassium 4.4 3.5 - 5.1 mmol/L    Chloride 106 98 - 112 mmol/L    CO2 28.0 21.0 - 32.0 mmol/L    Anion Gap 8 0 - 18 mmol/L    BUN 20 (H) 7 - 18 mg/dL    Creatinine 1.03 0.70 - 1.30 mg/dL    BUN/CREA Ratio 19.4 10.0 - 20.0    Calcium, Total 8.8 8.5 - 10.1 mg/dL    Calculated Osmolality 296 (H) 275 - 295 mOsm/kg    eGFR-Cr 76 >=60 mL/min/1.73m2    Patient Fasting for BMP? Yes      *Note: Due to a large number of results and/or encounters for the requested time period, some results have not been displayed. A complete set of results can be found in Results Review.                  Passed - In person appointment or virtual visit in the past 6 months     Recent Outpatient Visits              1 week ago Chronic pain of both [place] [address], [address], Tim Kaplan MD    Office Visit    2 months ago Essential hypertension    [address] [facility], Cassie Whitney MD    Office Visit    3 months ago Urinary retention    [address], [address], Jaki Lee MD    Office Visit    3 months ago Urinary retention    [address], [address], [place]    Nurse Only    3 months ago BPH with obstruction/lower urinary tract symptoms    [facility], [address], Greg Sow MD    Office Visit          Future Appointments         Provider Department Appt Notes    In 4 weeks Todd Garibay MD [address] [facility], [place] px [birth year] Policy advised    In 1 month Neftali Case MD [address], [address], [place] MEDICARE  NON SX F/U               Passed - EGFRCR or GFRNAA > 50     GFR Evaluation  EGFRCR: 76 , resulted on 12/20/2022                  Future Appointments         Provider Department Appt Notes    In 4 weeks Todd Garibay MD [address] [facility], [place] px 6/75/50 Policy advised    In 1 month Neftali Case MD [address], [facility] [facility], [place] MEDICARE  NON SX F/U             Recent Outpatient Visits              1 week ago Chronic pain of both knees    [address] [place] Maria Todd MD    Office Visit    2 months ago Essential hypertension    [address] [facility], Imelda Flores MD    Office Visit    3 months ago Urinary retention    [address] [facility], Annabelle Mena MD    Office Visit    3 months ago Urinary retention    [address], [address], [place]    Nurse Only    3 months ago BPH with obstruction/lower urinary tract symptoms    [address] [facility], Annabelle Mena MD    Office Visit

## 2023-06-06 ENCOUNTER — NURSE TRIAGE (OUTPATIENT)
Dept: INTERNAL MEDICINE CLINIC | Facility: CLINIC | Age: 78
End: 2023-06-06

## 2023-06-06 ENCOUNTER — HOSPITAL ENCOUNTER (OUTPATIENT)
Dept: ULTRASOUND IMAGING | Facility: HOSPITAL | Age: 78
Discharge: HOME | End: 2023-06-06
Attending: INTERNAL MEDICINE
Payer: MEDICARE

## 2023-06-06 DIAGNOSIS — I73.9 PAD (PERIPHERAL ARTERY DISEASE) (HCC): ICD-10-CM

## 2023-06-06 PROCEDURE — 93923 UPR/LXTR ART STDY 3+ LVLS: CPT | Performed by: INTERNAL MEDICINE

## 2023-06-06 NOTE — IMAGING NOTE
1215: Met pt in Vascular exam room after request from [facility] to see pt due to elevated BP. Pt in chair, said he is comfortable, denies pain, headache, other symptoms. US tech said  at start of exam, after completion .    1217: /99, pulse 68. Pt states took 100 mg Losartan approx 1030, missed Norvasc dose last alva when he fell asleep early. Pt did not check his BP this am. Pt denies anxiety but said parking was difficult  & he was frustrated. I reminded him we have [address] parking, consider using it in the future, we don't want patients to be frustrated when parking is limited. 1226: /83, pulse 67, pt continues to deny complaints. Pt took a phone call, said it was important    [id number] 9032: /83. Called Dr Ashley Calixto office, spoke to Triage nurse Gely Ellis, informed her of all, she recommends pt go to ED, I informed pt but he said no, he has an appt he can't miss; I asked if it was a healthcare appt, he said no. I let Triage nurse talk to pt, he told her he would go to Immediate Care after his appt. Pt gave me back the phone, pt said he was becoming nervous about possibly missing his appt, will go Immediate Care in [place], near his home. I told Triage nurse this. Walked pt out to reception area at approx 1240.

## 2023-06-07 ENCOUNTER — TELEPHONE (OUTPATIENT)
Dept: INTERNAL MEDICINE CLINIC | Facility: CLINIC | Age: 78
End: 2023-06-07

## 2023-06-07 NOTE — TELEPHONE ENCOUNTER
Dr. Gertrude Lim, please see patient's updated blood pressure readings (entered into patient reported vitals).

## 2023-06-07 NOTE — TELEPHONE ENCOUNTER
Patient is calling with an update on his BP. Adeline Landaverde Once patient took his dose of Amlodipine last night his BP is back to normal.    7:30 reading 159/89  8:30 reading 135/64    Morning reading 135/64    Patient is scheduled to see Dr Ritchie Garza on 6-13 for a follow up appointment

## 2023-06-13 ENCOUNTER — OFFICE VISIT (OUTPATIENT)
Dept: INTERNAL MEDICINE CLINIC | Facility: CLINIC | Age: 78
End: 2023-06-13

## 2023-06-13 ENCOUNTER — LAB ENCOUNTER (OUTPATIENT)
Dept: LAB | Age: 78
End: 2023-06-13
Attending: INTERNAL MEDICINE
Payer: MEDICARE

## 2023-06-13 VITALS
HEART RATE: 63 BPM | HEIGHT: 64.4 IN | TEMPERATURE: 98 F | WEIGHT: 202.81 LBS | BODY MASS INDEX: 34.2 KG/M2 | OXYGEN SATURATION: 96 % | SYSTOLIC BLOOD PRESSURE: 138 MMHG | DIASTOLIC BLOOD PRESSURE: 76 MMHG

## 2023-06-13 DIAGNOSIS — Z12.11 COLON CANCER SCREENING: ICD-10-CM

## 2023-06-13 DIAGNOSIS — E55.9 VITAMIN D DEFICIENCY: ICD-10-CM

## 2023-06-13 DIAGNOSIS — I10 ESSENTIAL HYPERTENSION: ICD-10-CM

## 2023-06-13 DIAGNOSIS — E78.2 MIXED HYPERLIPIDEMIA: ICD-10-CM

## 2023-06-13 DIAGNOSIS — N40.1 BENIGN PROSTATIC HYPERPLASIA WITH URINARY RETENTION: ICD-10-CM

## 2023-06-13 DIAGNOSIS — Z12.5 PROSTATE CANCER SCREENING: ICD-10-CM

## 2023-06-13 DIAGNOSIS — E66.09 CLASS 1 OBESITY DUE TO EXCESS CALORIES WITH SERIOUS COMORBIDITY AND BODY MASS INDEX (BMI) OF 33.0 TO 33.9 IN ADULT: ICD-10-CM

## 2023-06-13 DIAGNOSIS — N52.9 ERECTILE DYSFUNCTION, UNSPECIFIED ERECTILE DYSFUNCTION TYPE: ICD-10-CM

## 2023-06-13 DIAGNOSIS — M17.11 PRIMARY OSTEOARTHRITIS OF ONE KNEE, RIGHT: ICD-10-CM

## 2023-06-13 DIAGNOSIS — Z00.00 MEDICARE ANNUAL WELLNESS VISIT, SUBSEQUENT: ICD-10-CM

## 2023-06-13 DIAGNOSIS — R33.8 BENIGN PROSTATIC HYPERPLASIA WITH URINARY RETENTION: ICD-10-CM

## 2023-06-13 DIAGNOSIS — Z00.00 MEDICARE ANNUAL WELLNESS VISIT, SUBSEQUENT: Primary | ICD-10-CM

## 2023-06-13 DIAGNOSIS — H25.9 AGE-RELATED CATARACT OF BOTH EYES, UNSPECIFIED AGE-RELATED CATARACT TYPE: ICD-10-CM

## 2023-06-13 LAB
ALBUMIN SERPL-MCNC: 3.4 G/DL (ref 3.4–5)
ALBUMIN/GLOB SERPL: 0.9 {RATIO} (ref 1–2)
ALP LIVER SERPL-CCNC: 128 U/L
ALT SERPL-CCNC: 16 U/L
ANION GAP SERPL CALC-SCNC: 4 MMOL/L (ref 0–18)
AST SERPL-CCNC: 12 U/L (ref 15–37)
BASOPHILS # BLD AUTO: 0.05 X10(3) UL (ref 0–0.2)
BASOPHILS NFR BLD AUTO: 0.8 %
BILIRUB SERPL-MCNC: 0.5 MG/DL (ref 0.1–2)
BUN BLD-MCNC: 21 MG/DL (ref 7–18)
CALCIUM BLD-MCNC: 8.7 MG/DL (ref 8.5–10.1)
CHLORIDE SERPL-SCNC: 108 MMOL/L (ref 98–112)
CHOLEST SERPL-MCNC: 163 MG/DL (ref ?–200)
CO2 SERPL-SCNC: 25 MMOL/L (ref 21–32)
COMPLEXED PSA SERPL-MCNC: 4.99 NG/ML (ref ?–4)
CREAT BLD-MCNC: 1.05 MG/DL
EOSINOPHIL # BLD AUTO: 0.35 X10(3) UL (ref 0–0.7)
EOSINOPHIL NFR BLD AUTO: 5.3 %
ERYTHROCYTE [DISTWIDTH] IN BLOOD BY AUTOMATED COUNT: 13.6 %
FASTING PATIENT LIPID ANSWER: YES
FASTING STATUS PATIENT QL REPORTED: YES
GFR SERPLBLD BASED ON 1.73 SQ M-ARVRAT: 74 ML/MIN/1.73M2 (ref 60–?)
GLOBULIN PLAS-MCNC: 3.9 G/DL (ref 2.8–4.4)
GLUCOSE BLD-MCNC: 90 MG/DL (ref 70–99)
HCT VFR BLD AUTO: 43.3 %
HDLC SERPL-MCNC: 39 MG/DL (ref 40–59)
HGB BLD-MCNC: 13.9 G/DL
IMM GRANULOCYTES # BLD AUTO: 0.03 X10(3) UL (ref 0–1)
IMM GRANULOCYTES NFR BLD: 0.5 %
LDLC SERPL CALC-MCNC: 110 MG/DL (ref ?–100)
LYMPHOCYTES # BLD AUTO: 1.39 X10(3) UL (ref 1–4)
LYMPHOCYTES NFR BLD AUTO: 21.2 %
MCH RBC QN AUTO: 28.3 PG (ref 26–34)
MCHC RBC AUTO-ENTMCNC: 32.1 G/DL (ref 31–37)
MCV RBC AUTO: 88 FL
MONOCYTES # BLD AUTO: 0.51 X10(3) UL (ref 0.1–1)
MONOCYTES NFR BLD AUTO: 7.8 %
NEUTROPHILS # BLD AUTO: 4.24 X10 (3) UL (ref 1.5–7.7)
NEUTROPHILS # BLD AUTO: 4.24 X10(3) UL (ref 1.5–7.7)
NEUTROPHILS NFR BLD AUTO: 64.4 %
NONHDLC SERPL-MCNC: 124 MG/DL (ref ?–130)
OSMOLALITY SERPL CALC.SUM OF ELEC: 287 MOSM/KG (ref 275–295)
PLATELET # BLD AUTO: 157 10(3)UL (ref 150–450)
POTASSIUM SERPL-SCNC: 3.9 MMOL/L (ref 3.5–5.1)
PROT SERPL-MCNC: 7.3 G/DL (ref 6.4–8.2)
RBC # BLD AUTO: 4.92 X10(6)UL
SODIUM SERPL-SCNC: 137 MMOL/L (ref 136–145)
TRIGL SERPL-MCNC: 75 MG/DL (ref 30–149)
VIT D+METAB SERPL-MCNC: 43.6 NG/ML (ref 30–100)
VLDLC SERPL CALC-MCNC: 13 MG/DL (ref 0–30)
WBC # BLD AUTO: 6.6 X10(3) UL (ref 4–11)

## 2023-06-13 PROCEDURE — 80061 LIPID PANEL: CPT

## 2023-06-13 PROCEDURE — 1126F AMNT PAIN NOTED NONE PRSNT: CPT | Performed by: INTERNAL MEDICINE

## 2023-06-13 PROCEDURE — 85025 COMPLETE CBC W/AUTO DIFF WBC: CPT

## 2023-06-13 PROCEDURE — 82306 VITAMIN D 25 HYDROXY: CPT

## 2023-06-13 PROCEDURE — 36415 COLL VENOUS BLD VENIPUNCTURE: CPT

## 2023-06-13 PROCEDURE — 80053 COMPREHEN METABOLIC PANEL: CPT

## 2023-06-13 PROCEDURE — G0439 PPPS, SUBSEQ VISIT: HCPCS | Performed by: INTERNAL MEDICINE

## 2023-06-14 PROBLEM — R33.8 BENIGN PROSTATIC HYPERPLASIA WITH URINARY RETENTION: Status: ACTIVE | Noted: 2023-06-14

## 2023-06-14 PROBLEM — N21.0 URINARY BLADDER STONE: Status: RESOLVED | Noted: 2022-05-29 | Resolved: 2023-06-14

## 2023-06-14 PROBLEM — H25.9 AGE-RELATED CATARACT OF BOTH EYES: Status: ACTIVE | Noted: 2023-06-14

## 2023-06-14 PROBLEM — N40.1 BENIGN PROSTATIC HYPERPLASIA WITH URINARY RETENTION: Status: ACTIVE | Noted: 2023-06-14

## 2023-06-14 PROBLEM — M17.11 PRIMARY OSTEOARTHRITIS OF ONE KNEE, RIGHT: Status: ACTIVE | Noted: 2023-06-14

## 2023-06-21 ENCOUNTER — OFFICE VISIT (OUTPATIENT)
Dept: SURGERY | Facility: CLINIC | Age: 78
End: 2023-06-21
Payer: MEDICARE

## 2023-06-21 VITALS
HEART RATE: 66 BPM | BODY MASS INDEX: 34.4 KG/M2 | OXYGEN SATURATION: 95 % | DIASTOLIC BLOOD PRESSURE: 80 MMHG | HEIGHT: 64.4 IN | SYSTOLIC BLOOD PRESSURE: 124 MMHG | WEIGHT: 204 LBS

## 2023-06-21 DIAGNOSIS — E78.2 MIXED HYPERLIPIDEMIA: ICD-10-CM

## 2023-06-21 DIAGNOSIS — E66.9 OBESITY (BMI 30-39.9): ICD-10-CM

## 2023-06-21 DIAGNOSIS — M17.0 PRIMARY OSTEOARTHRITIS OF BOTH KNEES: ICD-10-CM

## 2023-06-21 DIAGNOSIS — I10 ESSENTIAL HYPERTENSION: Primary | ICD-10-CM

## 2023-06-21 PROCEDURE — 99214 OFFICE O/P EST MOD 30 MIN: CPT | Performed by: INTERNAL MEDICINE

## 2023-07-05 ENCOUNTER — HOSPITAL ENCOUNTER (OUTPATIENT)
Dept: MRI IMAGING | Age: 78
Discharge: HOME | End: 2023-07-05
Attending: INTERNAL MEDICINE
Payer: MEDICARE

## 2023-07-05 DIAGNOSIS — M17.11 PRIMARY OSTEOARTHRITIS OF ONE KNEE, RIGHT: ICD-10-CM

## 2023-07-05 PROCEDURE — 73721 MRI JNT OF LWR EXTRE W/O DYE: CPT | Performed by: INTERNAL MEDICINE

## 2023-07-08 ENCOUNTER — TELEPHONE (OUTPATIENT)
Dept: INTERNAL MEDICINE CLINIC | Facility: CLINIC | Age: 78
End: 2023-07-08

## 2023-07-08 DIAGNOSIS — M25.469 SWELLING OF KNEE JOINT, UNSPECIFIED LATERALITY: Primary | ICD-10-CM

## 2023-07-11 ENCOUNTER — LAB ENCOUNTER (OUTPATIENT)
Dept: LAB | Age: 78
End: 2023-07-11
Attending: INTERNAL MEDICINE
Payer: MEDICARE

## 2023-07-11 DIAGNOSIS — M25.469 SWELLING OF KNEE JOINT, UNSPECIFIED LATERALITY: ICD-10-CM

## 2023-07-11 LAB
CRP SERPL-MCNC: 0.65 MG/DL (ref ?–0.3)
ERYTHROCYTE [SEDIMENTATION RATE] IN BLOOD: 39 MM/HR
RHEUMATOID FACT SERPL-ACNC: <10 IU/ML (ref ?–15)

## 2023-07-11 PROCEDURE — 85652 RBC SED RATE AUTOMATED: CPT

## 2023-07-11 PROCEDURE — 86140 C-REACTIVE PROTEIN: CPT

## 2023-07-11 PROCEDURE — 86200 CCP ANTIBODY: CPT

## 2023-07-11 PROCEDURE — 86431 RHEUMATOID FACTOR QUANT: CPT

## 2023-07-11 PROCEDURE — 36415 COLL VENOUS BLD VENIPUNCTURE: CPT

## 2023-07-13 LAB — CCP IGG SERPL-ACNC: 0.6 U/ML (ref 0–6.9)

## 2023-08-21 RX ORDER — AMLODIPINE BESYLATE 10 MG/1
10 TABLET ORAL DAILY
Qty: 90 TABLET | Refills: 3 | Status: SHIPPED | OUTPATIENT
Start: 2023-08-21

## 2023-08-22 NOTE — TELEPHONE ENCOUNTER
Refill passed per [employer] protocol.      Requested Prescriptions   Pending Prescriptions Disp Refills    AMLODIPINE 10 MG Oral Tab [Pharmacy Med Name: AMLODIPINE BESYLATE 10MG TABLETS] 90 tablet 1     Sig: TAKE 1 TABLET(10 MG) BY MOUTH DAILY       Hypertensive Medications Protocol Passed - 8/20/2023  4:46 PM        Passed - In person appointment in the past 12 or next 3 months     Recent Outpatient Visits              2 months ago Essential hypertension    Dariel Candelaria, [address], Lai Oh MD    Office Visit    2 months ago [employer] annual wellness visit, subsequent    [facility] Sergio Flores MD    Office Visit    3 months ago Chronic pain of both Rebekah Griffith, Collette Leyland, MD    Office Visit    6 months ago Essential hypertension    [place] Mark, Lai Oh MD    Office Visit    6 months ago Urinary retention    Dariel Candelaria, [address], Dionisio Momin MD    Office Visit          Future Appointments         Provider Department Appt Notes    In 2 months Blank Hicks MD [facility], [address], [place]                Passed - Last BP reading less than 140/90     BP Readings from Last 1 Encounters:  06/21/23 : 124/80              Passed - CMP or BMP in past 6 months     Recent Results (from the past 4392 hour(s))   COMP METABOLIC PANEL (14)    Collection Time: 06/13/23  9:37 AM   Result Value Ref Range    Glucose 90 70 - 99 mg/dL    Sodium 137 136 - 145 mmol/L    Potassium 3.9 3.5 - 5.1 mmol/L    Chloride 108 98 - 112 mmol/L    CO2 25.0 21.0 - 32.0 mmol/L    Anion Gap 4 0 - 18 mmol/L    BUN 21 (H) 7 - 18 mg/dL    Creatinine 1.05 0.70 - 1.30 mg/dL    Calcium, Total 8.7 8.5 - 10.1 mg/dL    Calculated Osmolality 287 275 - 295 mOsm/kg    eGFR-Cr 74 >=60 mL/min/1.73m2    AST 12 (L) 15 - 37 U/L    ALT 16 16 - 61 U/L    Alkaline Phosphatase 128 (H) 45 - 117 U/L    Bilirubin, Total 0.5 0.1 - 2.0 mg/dL    Total Protein 7.3 6.4 - 8.2 g/dL    Albumin 3.4 3.4 - 5.0 g/dL    Globulin  3.9 2.8 - 4.4 g/dL    A/G Ratio 0.9 (L) 1.0 - 2.0    Patient Fasting for CMP? Yes      *Note: Due to a large number of results and/or encounters for the requested time period, some results have not been displayed. A complete set of results can be found in Results Review.                Passed - In person appointment or virtual visit in the past 6 months     Recent Outpatient Visits              2 months ago Essential hypertension    Mynor Maher MD    Office Visit    2 months ago [employer] annual wellness visit, subsequent    Mckay Maher MD    Office Visit    3 months ago Chronic pain of both knees    Mckay Maher MD    Office Visit    6 months ago Essential hypertension    Mynor Maher MD    Office Visit    6 months ago Urinary retention    Db Maher MD    Office Visit          Future Appointments         Provider Department Appt Notes    In 2 months Fareed Amezcua MD [address], [address] [facility],[address] [facility], [facility] 25 or GFRNAA > 50     GFR Evaluation  EGFRCR: 74 , resulted on 6/13/2023                Future Appointments         Provider Department Appt Notes    In 2 months Fareed Amezcua MD [address], [address] [facility],[address], [place]              Recent Outpatient Visits              2 months ago Essential hypertension    Mynor Maher MD    Office Visit    2 months ago [employer] annual wellness visit, subsequent    [address] [facility] Aleksandr Osorio MD    Office Visit    3 months ago Chronic pain of both Suman Alejandrecristiannona, Marbella Horton MD    Office Visit    6 months ago Essential hypertension    [address] [facility], Daniel Allen MD    Office Visit    6 months ago Urinary retention    [address] [facility], Ana Mulligan MD    Office Visit

## 2023-09-18 NOTE — TELEPHONE ENCOUNTER
Please review. Protocol failed / No protocol. Requested Prescriptions   Pending Prescriptions Disp Refills    guaiFENesin-codeine 100-10 MG/5ML Oral Solution 240 mL 0     Sig: Take 5 mL by mouth every 6 (six) hours as needed for cough.        There is no refill protocol information for this order          Future Appointments         Provider Department Appt Notes    In 1 month Santino Howard MD [address] [facility],[address], [address], [place]             Recent Outpatient Visits              2 months ago Essential hypertension    [address] [facility], Madyson Samaniego MD    Office Visit    3 months ago [employer] annual wellness visit, subsequent    [address] [facility], Harvinder Dumont MD    Office Visit    4 months ago Chronic pain of both [place], Harvinder Dumont MD    Office Visit    6 months ago Essential hypertension    [address] [facility], Madyson Samaniego MD    Office Visit    7 months ago Urinary retention    [address] [facility], Josefina Donovan MD    Office Visit

## 2023-09-19 RX ORDER — CODEINE PHOSPHATE AND GUAIFENESIN 10; 100 MG/5ML; MG/5ML
5 SOLUTION ORAL EVERY 6 HOURS PRN
Qty: 240 ML | Refills: 0 | Status: SHIPPED | OUTPATIENT
Start: 2023-09-19

## 2023-12-01 RX ORDER — HYDRALAZINE HYDROCHLORIDE 10 MG/1
10 TABLET, FILM COATED ORAL 3 TIMES DAILY
Qty: 270 TABLET | Refills: 3 | Status: SHIPPED | OUTPATIENT
Start: 2023-12-01

## 2023-12-01 NOTE — TELEPHONE ENCOUNTER
Refill passed per [facility] protocol.  Requested Prescriptions   Pending Prescriptions Disp Refills    HYDRALAZINE 10 MG Oral Tab [Pharmacy Med Name: HYDRALAZINE 10 MG TABLETS (ORANGE)] 270 tablet 1     Sig: TAKE 1 TABLET(10 MG) BY MOUTH THREE TIMES DAILY       Hypertensive Medications Protocol Passed - 11/30/2023 10:11 AM        Passed - In person appointment in the past 12 or next 3 months     Recent Outpatient Visits              5 months ago [facility]ential [facility]    [facility]    Office Visit    5 months ago Medicare annual wellness visit, subsequent    [facility] Gucci Culp MD    Office Visit    7 months ago Chronic pain of both knees    [facility] Gucci Culp MD    Office Visit    9 months ago [facility]    [facility]    Office Visit    9 months ago Urinary retention    [facility] My[facility]na Read MD    Office Visit          Future Appointments         Provider Department Appt Notes    In 2 months Angel Cardoza MD [facility]                Passed - Last BP reading less than 140/90     BP Readings from Last 1 Encounters:   06/21/23 124/80               Passed - CMP or BMP in past 6 months     Recent Results (from the past 4392 hour(s))   Comp Metabolic Panel (14)    Collection Time: 06/13/23  9:37 AM   Result Value Ref Range    Glucose 90 70 - 99 mg/dL    Sodium 137 136 - 145 mmol/L    Potassium 3.9 3.5 - 5.1 mmol/L    Chloride 108 98 - 112 mmol/L    CO2 25.0 21.0 - 32.0 mmol/L    Anion Gap 4 0 - 18 mmol/L    BUN 21 (H) 7 - 18 mg/dL    Creatinine 1.05 0.70 - 1.30 mg/dL    Calcium, Total 8.7 8.5 - 10.1 mg/dL    Calculated Osmolality 287 275 - 295 mOsm/kg    eGFR-Cr 74 >=60 mL/min/1.73m2    AST  12 (L) 15 - 37 U/L    ALT 16 16 - 61 U/L    Alkaline Phosphatase 128 (H) 45 - 117 U/L    Bilirubin, Total 0.5 0.1 - 2.0 mg/dL    Total Protein 7.3 6.4 - 8.2 g/dL    Albumin 3.4 3.4 - 5.0 g/dL    Globulin  3.9 2.8 - 4.4 g/dL    A/G Ratio 0.9 (L) 1.0 - 2.0    Patient Fasting for CMP? Yes      *Note: Due to a large number of results and/or encounters for the requested time period, some results have not been displayed. A complete set of results can be found in Results Review.               Passed - In person appointment or virtual visit in the past 6 months     Recent Outpatient Visits              5 months ago [facility]    [facility]    Office Visit    5 months ago Medicare annual wellness visit, subsequent    [facility] Gucci Castro MD    Office Visit    7 months ago Chronic pain of both knees    [facility] Gucci Castro MD    Office Visit    9 months ago Essential hypertension    [facility] Angel Cardoza MD    Office Visit    9 months ago Urinary retention    [facility] Myrna Read MD    Office Visit          Future Appointments         Provider Department Appt Notes    In 2 months Angel Cardoza MD [facility]                Passed - EGFRCR or GFRNAA > 50     GFR Evaluation  EGFRCR: 74 , resulted on 6/13/2023             Recent Outpatient Visits              5 months ago [facility]    [facility]    Office Visit    5 months ago Medicare annual wellness visit, subsequent    [facility] Gucci Castro MD    Office Visit    7 months ago Chronic pain of both knees    [facility]  Gucci Culp MD    Office Visit    9 months ago Essential hypertension    [facility] Angel Cardoza MD    Office Visit    9 months ago Urinary retention    [facility] Myrna Read MD    Office Visit          Future Appointments         Provider Department Appt Notes    In 2 months Angel Cardoza MD [facility]

## 2023-12-28 RX ORDER — CODEINE PHOSPHATE AND GUAIFENESIN 10; 100 MG/5ML; MG/5ML
5 SOLUTION ORAL EVERY 6 HOURS PRN
Qty: 240 ML | Refills: 0 | OUTPATIENT
Start: 2023-12-28

## 2023-12-29 RX ORDER — CODEINE PHOSPHATE AND GUAIFENESIN 10; 100 MG/5ML; MG/5ML
5 SOLUTION ORAL EVERY 6 HOURS PRN
Qty: 240 ML | Refills: 0 | Status: SHIPPED | OUTPATIENT
Start: 2023-12-29

## 2023-12-29 NOTE — TELEPHONE ENCOUNTER
Please review; protocol failed/No Protocol  Requested Prescriptions   Pending Prescriptions Disp Refills    guaiFENesin-codeine 100-10 MG/5ML Oral Solution 240 mL 0     Sig: Take 5 mL by mouth every 6 (six) hours as needed for cough.        There is no refill protocol information for this order        Future Appointments         Provider Department Appt Notes    In 1 month Alisia Jarrett MD [address] [place],[address], [address] [facility],[address], [place]           Recent Outpatient Visits              6 months ago Essential hypertension    Yvette Mcdaniel MD    Office Visit    6 months ago [employer] annual wellness visit, subsequent    Hailey Mcdaniel MD    Office Visit    8 months ago Chronic pain of both Orvan Sender, Hailey Veronica MD    Office Visit    10 months ago Essential hypertension    Yvette Mcdaniel MD    Office Visit    10 months ago Urinary retention    Carol Mcdaniel MD    Office Visit

## 2024-01-03 ENCOUNTER — HOSPITAL ENCOUNTER (OUTPATIENT)
Age: 79
Discharge: HOME | End: 2024-01-03
Payer: MEDICARE

## 2024-01-03 ENCOUNTER — APPOINTMENT (OUTPATIENT)
Dept: GENERAL RADIOLOGY | Age: 79
End: 2024-01-03
Attending: NURSE PRACTITIONER
Payer: MEDICARE

## 2024-01-03 ENCOUNTER — HOSPITAL ENCOUNTER (OUTPATIENT)
Age: 79
Discharge: STILL ADMITTED OR ADMINISTRATIVE | End: 2024-01-03
Payer: MEDICARE

## 2024-01-03 VITALS
SYSTOLIC BLOOD PRESSURE: 183 MMHG | WEIGHT: 200 LBS | OXYGEN SATURATION: 96 % | RESPIRATION RATE: 20 BRPM | HEART RATE: 69 BPM | HEIGHT: 66 IN | BODY MASS INDEX: 32.14 KG/M2 | DIASTOLIC BLOOD PRESSURE: 76 MMHG | TEMPERATURE: 98 F

## 2024-01-03 DIAGNOSIS — J18.9 COMMUNITY ACQUIRED PNEUMONIA, UNSPECIFIED LATERALITY: Primary | ICD-10-CM

## 2024-01-03 PROCEDURE — 71046 X-RAY EXAM CHEST 2 VIEWS: CPT | Performed by: NURSE PRACTITIONER

## 2024-01-03 RX ORDER — DOXYCYCLINE HYCLATE 100 MG/1
100 CAPSULE ORAL 2 TIMES DAILY
Qty: 20 CAPSULE | Refills: 0 | Status: SHIPPED | OUTPATIENT
Start: 2024-01-03 | End: 2024-01-13

## 2024-01-03 RX ORDER — AMOXICILLIN AND CLAVULANATE POTASSIUM 875; 125 MG/1; MG/1
1 TABLET, FILM COATED ORAL 2 TIMES DAILY
Qty: 20 TABLET | Refills: 0 | Status: SHIPPED | OUTPATIENT
Start: 2024-01-03 | End: 2024-01-13

## 2024-01-03 NOTE — ED PROVIDER NOTES
Patient Seen in: [facility]      History     Chief Complaint   Patient presents with    Cough     Entered by patient     Stated Complaint: Cough    Subjective:   HPI    This is a well-appearing 78-year-old history of asthma, hypertension, hyperlipidemia, CAD who presents with cough.  Patient states cough which started on 12/20.  Patient states he has not had any fever or chills.  He is taking guaifenesin with codeine but slight relief in symptoms.  Denies any shortness of breath or chest pain.    Objective:   Past Medical History:   Diagnosis Date    Asthma     Asymptomatic gallstones     Atherosclerosis of coronary artery     BPH (benign prostatic hyperplasia)     BPH with elevated PSA     Cataract     Coronary atherosclerosis     Essential hypertension     Hay fever     High blood pressure     High cholesterol     Hx of motion sickness     Hyperlipidemia     Obesity (BMI 30-39.9)     PONV (postoperative nausea and vomiting)     Pulmonary nodule 02/26/2018    Visual impairment     glasses              Past Surgical History:   Procedure Laterality Date    APPENDECTOMY      CATARACT      CATARACT EXTRACTION W/ INTRAOCULAR LENS IMPLANT Right 08/25/2021    TASIA CE LENSX/ORA TORIC MAC OD SN6AT5 +13.5    CATARACT EXTRACTION W/ INTRAOCULAR LENS IMPLANT Left 09/08/2021    TASIA CE LENSX/ORA TRAD MAC OS    COLONOSCOPY      2016 rectal polyp    COLONOSCOPY      2021 polyp dr larry repeat in 5 yrs    COLONOSCOPY      HERNIA SURGERY                  Social History     Socioeconomic History    Marital status:    Tobacco Use    Smoking status: Former     Packs/day: 0.50     Years: 10.00     Additional pack years: 0.00     Total pack years: 5.00     Types: Cigarettes     Passive exposure: Past    Smokeless tobacco: Never   Vaping Use    Vaping Use: Never used   Substance and Sexual Activity    Alcohol use: Yes     Comment: 2 x monthly/socially    Drug use: Never   Other Topics Concern    Caffeine Concern Yes      Comment: Coffee 2 cups daily              Review of Systems   Respiratory:  Positive for cough.    All other systems reviewed and are negative.      Positive for stated complaint: Cough  Other systems are as noted in HPI.  Constitutional and vital signs reviewed.      All other systems reviewed and negative except as noted above.    Physical Exam     ED Triage Vitals [01/03/24 1330]   BP (!) 183/76   Pulse 69   Resp 20   Temp 98.1 °F (36.7 °C)   Temp src Oral   SpO2 96 %   O2 Device None (Room air)       Current:BP (!) 183/76   Pulse 69   Temp 98.1 °F (36.7 °C) (Oral)   Resp 20   Ht 167.6 cm (5' 6\")   Wt 90.7 kg   SpO2 96%   BMI 32.28 kg/m²         Physical Exam  Vitals and nursing note reviewed.   Constitutional:       General: He is awake. He is not in acute distress.     Appearance: Normal appearance. He is not ill-appearing, toxic-appearing or diaphoretic.   HENT:      Head: Normocephalic and atraumatic.      Right Ear: Tympanic membrane, ear canal and external ear normal.      Left Ear: Tympanic membrane, ear canal and external ear normal.      Nose: Nose normal.      Mouth/Throat:      Mouth: Mucous membranes are moist.      Pharynx: Oropharynx is clear. Uvula midline.   Eyes:      General: Lids are normal.      Extraocular Movements: Extraocular movements intact.      Conjunctiva/sclera: Conjunctivae normal.      Pupils: Pupils are equal, round, and reactive to light.   Cardiovascular:      Rate and Rhythm: Normal rate and regular rhythm.      Pulses: Normal pulses.      Heart sounds: Normal heart sounds.   Pulmonary:      Effort: Pulmonary effort is normal.      Breath sounds: Normal breath sounds and air entry. No stridor, decreased air movement or transmitted upper airway sounds.   Skin:     General: Skin is warm and dry.      Capillary Refill: Capillary refill takes less than 2 seconds.   Neurological:      General: No focal deficit present.      Mental Status: He is alert and oriented to  person, place, and time.   Psychiatric:         Mood and Affect: Mood normal.         Behavior: Behavior normal. Behavior is cooperative.         Thought Content: Thought content normal.         Judgment: Judgment normal.       ED Course   Labs Reviewed - No data to display  CXR and re-evaluate.     Vague somewhat horizontal linear opacity in the left mid chest needs additional minimal abscess or scar but is new from previous study.  Recommend short-term follow-up.  XR CHEST PA + LAT CHEST (CPT=71046)    Result Date: 1/3/2024  CONCLUSION:  1. Vague somewhat horizontal linear opacity in the left mid chest may suggest an area of minimal atelectasis or scar but is new from previous study.  Recommend short-term follow-up study in 10-14 days to assess for resolution, and if this persists, then CT scanning is advised since a developing nodule cannot entirely be excluded.    Dictated by (CST): Lucas Gilliam MD on 1/03/2024 at 2:13 PM     Finalized by (CST): Lucas Gilliam MD on 1/03/2024 at 2:16 PM          MDM     Medical Decision Making  Patient is well-appearing on exam, nontoxic appearance, exam as noted above.  Differential diagnosis included but not limited to pneumonia, bronchitis, viral URI.  Discussed the chest x-ray findings with the patient.  Will treat for pneumonia at this time with Augmentin and doxycycline which she has tolerated in the past.  He is not hypoxic, not tachycardic, in no distress.  Close follow-up with PCP was recommended.  Patient verbalized plan of care and stated understanding.    Problems Addressed:  Community acquired pneumonia, unspecified laterality: acute illness or injury    Amount and/or Complexity of Data Reviewed  Radiology: ordered and independent interpretation performed. Decision-making details documented in ED Course.  ECG/medicine tests: ordered and independent interpretation performed. Decision-making details documented in ED Course.    Risk  OTC drugs.  Prescription drug  management.        Disposition and Plan     Clinical Impression:  1. Community acquired pneumonia, unspecified laterality         Disposition:  Discharge  1/3/2024  2:24 pm    Follow-up:  Gucci Castro MD  [address] [place] [address]  [phone]                Medications Prescribed:  Discharge Medication List as of 1/3/2024  2:32 PM        START taking these medications    Details   amoxicillin clavulanate 875-125 MG Oral Tab Take 1 tablet by mouth 2 (two) times daily for 10 days., Normal, Disp-20 tablet, R-0      doxycycline 100 MG Oral Cap Take 1 capsule (100 mg total) by mouth 2 (two) times daily for 10 days., Normal, Disp-20 capsule, R-0

## 2024-01-03 NOTE — DISCHARGE INSTRUCTIONS
Please take antibiotics as prescribed. Close follow up with primary care provider is recommended. Any worsening symptoms please go to the ER.

## 2024-01-03 NOTE — ED INITIAL ASSESSMENT (HPI)
Pt reports cough, loss of voice beginning 12/20.   Denies fevers.  Taking guinafesin/codeine with moderate relief.   Covid + 10/15.

## 2024-02-23 ENCOUNTER — OFFICE VISIT (OUTPATIENT)
Dept: SURGERY | Facility: CLINIC | Age: 79
End: 2024-02-23
Payer: MEDICARE

## 2024-02-23 VITALS
HEART RATE: 69 BPM | HEIGHT: 64.4 IN | SYSTOLIC BLOOD PRESSURE: 150 MMHG | OXYGEN SATURATION: 96 % | WEIGHT: 208 LBS | BODY MASS INDEX: 35.08 KG/M2 | DIASTOLIC BLOOD PRESSURE: 82 MMHG

## 2024-02-23 DIAGNOSIS — M17.0 PRIMARY OSTEOARTHRITIS OF BOTH KNEES: ICD-10-CM

## 2024-02-23 DIAGNOSIS — E66.9 OBESITY (BMI 30-39.9): ICD-10-CM

## 2024-02-23 DIAGNOSIS — E78.2 MIXED HYPERLIPIDEMIA: ICD-10-CM

## 2024-02-23 DIAGNOSIS — I10 ESSENTIAL HYPERTENSION: Primary | ICD-10-CM

## 2024-02-23 PROCEDURE — 99214 OFFICE O/P EST MOD 30 MIN: CPT | Performed by: INTERNAL MEDICINE

## 2024-02-23 RX ORDER — HYDROCHLOROTHIAZIDE 12.5 MG/1
12.5 CAPSULE, GELATIN COATED ORAL DAILY
Qty: 30 CAPSULE | Refills: 3 | Status: SHIPPED | OUTPATIENT
Start: 2024-02-23

## 2024-02-23 RX ORDER — DIETHYLPROPION HYDROCHLORIDE 25 MG/1
1 TABLET ORAL DAILY
Qty: 30 TABLET | Refills: 3 | Status: SHIPPED | OUTPATIENT
Start: 2024-02-23 | End: 2024-03-24

## 2024-02-23 NOTE — PROGRESS NOTES
[facility], [facility], [place]  [address]  [facility] [address]  Dept: [phone]       Patient:  Marino Lozano  :      1945  MRN:      [id number]    Chief Complaint:    Chief Complaint   Patient presents with    Follow - Up    Weight Management       SUBJECTIVE     History of Present Illness:  Marino is being seen today for a follow-up for non surgical weight loss    Past Medical History:   Past Medical History:   Diagnosis Date    Asthma (HCC)     Asymptomatic gallstones     Atherosclerosis of coronary artery     BPH (benign prostatic hyperplasia)     BPH with elevated PSA     Cataract     Coronary atherosclerosis     Essential hypertension     Hay fever     High blood pressure     High cholesterol     Hx of motion sickness     Hyperlipidemia     Obesity (BMI 30-39.9)     PONV (postoperative nausea and vomiting)     Pulmonary nodule 2018    Visual impairment     glasses        Comorbidities:  Back pain-Improvement?  yes, Joint pain-Improvement?  yes, OSIEL-Improvement?  yes and Snoring-Improvement?  yes    OBJECTIVE     Vitals: /82 (BP Location: Right arm, Patient Position: Sitting, Cuff Size: adult)   Pulse 69   Ht 5' 4.4\" (1.636 m)   Wt 208 lb (94.3 kg)   SpO2 96%   BMI 35.26 kg/m²     Initial weight loss: +04   Total weight loss: -01   Start weight: 209    Wt Readings from Last 3 Encounters:   24 208 lb (94.3 kg)   24 200 lb (90.7 kg)   23 204 lb (92.5 kg)       Patient Medications:    Current Outpatient Medications   Medication Sig Dispense Refill    guaiFENesin-codeine 100-10 MG/5ML Oral Solution Take 5 mL by mouth every 6 (six) hours as needed for cough. 240 mL 0    hydrALAZINE 10 MG Oral Tab Take 1 tablet (10 mg total) by mouth 3 (three) times daily. 270 tablet 3    amLODIPine 10 MG Oral Tab Take 1 tablet (10 mg total) by mouth daily. 90 tablet 3    losartan 50 MG Oral Tab Take 2 tablets (100 mg total) by  mouth daily. 180 tablet 3    naproxen 500 MG Oral Tab       fluticasone propionate 50 MCG/ACT Nasal Suspension 2 sprays by Each Nare route daily. 3 each 1    Clobetasol Propionate 0.05 % External Gel Apply 1 Application. topically 2 (two) times daily. Apply to affected area as needed. 60 g 3    atorvastatin 10 MG Oral Tab Take 1 tablet (10 mg total) by mouth nightly. 90 tablet 1    Vardenafil HCl 20 MG Oral Tab Take 1 tablet (20 mg total) by mouth daily as needed. 30 MINS BEFORE SEX 30 tablet 2    SYMBICORT 160-4.5 MCG/ACT Inhalation Aerosol INHALE 2 PUFFS INTO THE LUNGS 2 (TWO) TIMES DAILY. 10 Inhaler 0     Allergies:  Seasonal     Social History:    Social History     Socioeconomic History    Marital status:      Spouse name: Not on file    Number of children: Not on file    Years of education: Not on file    Highest education level: Not on file   Occupational History    Not on file   Tobacco Use    Smoking status: Former     Packs/day: 0.50     Years: 10.00     Additional pack years: 0.00     Total pack years: 5.00     Types: Cigarettes     Passive exposure: Past    Smokeless tobacco: Never   Vaping Use    Vaping Use: Never used   Substance and Sexual Activity    Alcohol use: Yes     Comment: 2 x monthly/socially    Drug use: Never    Sexual activity: Not on file   Other Topics Concern     Service Not Asked    Blood Transfusions Not Asked    Caffeine Concern Yes     Comment: Coffee 2 cups daily    Occupational Exposure Not Asked    Hobby Hazards Not Asked    Sleep Concern Not Asked    Stress Concern Not Asked    Weight Concern Not Asked    Special Diet Not Asked    Back Care Not Asked    Exercise Not Asked    Bike Helmet Not Asked    Seat Belt Not Asked    Self-Exams Not Asked   Social History Narrative    Not on file     Social Determinants of Health     Financial Resource Strain: Not on file   Food Insecurity: Not on file   Transportation Needs: Not on file   Physical Activity: Not on file    Stress: Not on file   Social Connections: Not on file   Housing Stability: Not on file     Surgical History:    Past Surgical History:   Procedure Laterality Date    APPENDECTOMY      CATARACT      CATARACT EXTRACTION W/ INTRAOCULAR LENS IMPLANT Right 08/25/2021    TASIA CE LENSX/ORA TORIC MAC OD SN6AT5 +13.5    CATARACT EXTRACTION W/ INTRAOCULAR LENS IMPLANT Left 09/08/2021    TASIA CE LENSX/ORA TRAD MAC OS    COLONOSCOPY      2016 rectal polyp    COLONOSCOPY      2021 polyp dr larry repeat in 5 yrs    COLONOSCOPY      HERNIA SURGERY       Family History:    Family History   Problem Relation Age of Onset    Cancer Brother     Cancer Father         brain    Hypertension Father     Dementia Mother         Alzheimers    Other (Other) Sister         RA       Food Journal  Reviewed and Discussed:       Patient has a Food Journal?: yes   Patient is reading nutrition labels?  yes  Average Caloric Intake:     Average CHO Intake: 120  Is patient exercising? yes  Type of exercise? Limited with knee pain    Eating Habits  Patient states the following:  Eats 2 meal(s) per day  Length of time it takes to consume a meal:  20  # of snacks per day: 1 Type of snacks:  Crackers, jelly  Amount of soda consumption per day:    Amount of water (in ounces) per day:  inad  Drinking between meals only:  yes  Toughest challenge:  Water intake    Nutritional Goals  Limit carbohydrates to 100 gms per day, Eat 100-200 calories within 1 hour of waking  and Eat 3-4 cups of fresh fruits or vegetables daily    Behavior Modifications Reviewed and Discussed  Eat breakfast, Eat 3 meals per day, Plan meals in advance, Read nutrition labels, Drink 64 oz of water per day, Maintain a daily food journal, No drinking 30 minutes before or after meals, Utlize portion control strategies to reduce calorie intake, Identify triggers for eating and manage cues and Eat slowly and take 20 to 30 minutes to complete each meal    Exercise Goals Reviewed and  Discussed    Continue walking and biking    ROS:    Constitutional: negative  Respiratory: negative  Cardiovascular: negative  Gastrointestinal: negative  Musculoskeletal:negative  Neurological: negative  Behavioral/Psych: negative  Endocrine: negative  All other systems were reviewed and are negative    Physical Exam:   General appearance: alert, appears stated age, cooperative and morbidly obese  Head: Normocephalic, without obvious abnormality, atraumatic  Back: symmetric, no curvature. ROM normal. No CVA tenderness.  Lungs: clear to auscultation bilaterally  Heart: S1, S2 normal, no murmur, click, rub or gallop, regular rate and rhythm  Abdomen: soft, non-tender; bowel sounds normal; no masses,  no organomegaly  Extremities: extremities normal, atraumatic, no cyanosis or edema  Pulses: 2+ and symmetric  Skin: Skin color, texture, turgor normal. No rashes or lesions  Neurologic: Grossly normal    ASSESSMENT     HYPERTENSION:  The patient's blood pressure has been well controlled.  he has been checking it as instructed and has remained in relatively good control.    HYPERCHOLESTEROLEMIA:  The patient states that his cholesterol has been well controlled on his current medication.    Lab Results   Component Value Date/Time    CHOLEST 163 06/13/2023 09:37 AM     (H) 06/13/2023 09:37 AM    HDL 39 (L) 06/13/2023 09:37 AM    TRIG 75 06/13/2023 09:37 AM    VLDL 13 06/13/2023 09:37 AM         Encounter Diagnosis(ses):   Encounter Diagnoses   Name Primary?    Essential hypertension Yes    Mixed hyperlipidemia     Primary osteoarthritis of both knees     Obesity (BMI 30-39.9)        PLAN     Patient is not interested in bariatric surgery. Patient desires to pursue traditional weight loss at this time.      HYPERTENSION: Blood pressure stable on the above medications. No interval change in antihypertensive medication.     DYSLIPIDEMIA: Stable on the above prescribed meal plan and medication. Liver function  stable.    Lab Results   Component Value Date/Time    CHOLEST 163 06/13/2023 09:37 AM     (H) 06/13/2023 09:37 AM    HDL 39 (L) 06/13/2023 09:37 AM    TRIG 75 06/13/2023 09:37 AM    VLDL 13 06/13/2023 09:37 AM     DEGENERATIVE JOINT DISEASE: Of the knees is stable. Encourage upper body exercises if unable to perform weight-bearing exercises.        Goals for next month:  1. Keep a food log.  2. Drink 48-64 ounces of non-caloric beverages per day. No fruit juices or regular soda.  3. Increase activity-upper body exercises, walk 10 minutes per day.  4. Increase fruit and vegetable servings to 5-6 per day.      Happy with results    Will start hydrochlorothiazide 12.5 mg for OSIEL    Diethylpropion: Since the patient would like to try Diethylpropion, and is aware of the potential side effects (hypertension, palpitations, tachycardia, and anxiety), I will give a prescription today to be used in conjunction with the above diet and exercise program. The patient will check  heart rate and blood pressure on a regular basis. They will call me if her BP goes over 140/90 or has palpitations or racing heart rate. Patient understands that I will not call in the prescription they must have an appointment to have the medication refilled.    Will start at 25 mg    Increase fruit intake    No meds at this time    Needs to ambulate    Diagnoses and all orders for this visit:    Essential hypertension    Mixed hyperlipidemia    Primary osteoarthritis of both knees    Obesity (BMI 30-39.9)          Angel Cardoza MD

## 2024-04-19 ENCOUNTER — PATIENT MESSAGE (OUTPATIENT)
Dept: INTERNAL MEDICINE CLINIC | Facility: CLINIC | Age: 79
End: 2024-04-19

## 2024-04-23 ENCOUNTER — NURSE TRIAGE (OUTPATIENT)
Dept: INTERNAL MEDICINE CLINIC | Facility: CLINIC | Age: 79
End: 2024-04-23

## 2024-04-23 RX ORDER — METHYLPREDNISOLONE 4 MG/1
TABLET ORAL
Qty: 1 EACH | Refills: 0 | Status: SHIPPED | OUTPATIENT
Start: 2024-04-23

## 2024-04-23 NOTE — TELEPHONE ENCOUNTER
Please reply to pool: EM RN TRIAGE  Action Requested: Summary for Provider     []  Critical Lab, Recommendations Needed  [x] Need Additional Advice  []   FYI    []   Need Orders  [x] Need Medications Sent to Pharmacy  []  Other     SUMMARY: Patient contacts clinic reporting bilateral knee pain and swelling.  History of arthritis and Baker's cyst.  [facility].  Denies fever or redness.  Well documented history with Dr. Barker for this condition.  He is requesting a medrol dose pack.  Will come in if Dr Castro says so but requests message be sent to doctor first.     Reason for call: Knee Pain  Onset: Data Unavailable                       Reason for Disposition   Swollen knee joint (no fever or redness)    Protocols used: Knee Pain-A-[place]

## 2024-05-13 ENCOUNTER — HOSPITAL ENCOUNTER (OUTPATIENT)
Dept: GENERAL RADIOLOGY | Age: 79
Discharge: HOME | End: 2024-05-13
Attending: INTERNAL MEDICINE

## 2024-05-13 ENCOUNTER — LAB ENCOUNTER (OUTPATIENT)
Dept: LAB | Age: 79
End: 2024-05-13
Attending: INTERNAL MEDICINE

## 2024-05-13 ENCOUNTER — OFFICE VISIT (OUTPATIENT)
Dept: INTERNAL MEDICINE CLINIC | Facility: CLINIC | Age: 79
End: 2024-05-13
Payer: MEDICARE

## 2024-05-13 VITALS
BODY MASS INDEX: 35.85 KG/M2 | HEIGHT: 64 IN | DIASTOLIC BLOOD PRESSURE: 80 MMHG | SYSTOLIC BLOOD PRESSURE: 156 MMHG | HEART RATE: 64 BPM | WEIGHT: 210 LBS

## 2024-05-13 DIAGNOSIS — R53.83 OTHER FATIGUE: ICD-10-CM

## 2024-05-13 DIAGNOSIS — Z12.5 PROSTATE CANCER SCREENING: ICD-10-CM

## 2024-05-13 DIAGNOSIS — E78.2 MIXED HYPERLIPIDEMIA: ICD-10-CM

## 2024-05-13 DIAGNOSIS — I10 ESSENTIAL HYPERTENSION: ICD-10-CM

## 2024-05-13 DIAGNOSIS — M17.11 PRIMARY OSTEOARTHRITIS OF ONE KNEE, RIGHT: ICD-10-CM

## 2024-05-13 DIAGNOSIS — N52.9 ERECTILE DYSFUNCTION, UNSPECIFIED ERECTILE DYSFUNCTION TYPE: ICD-10-CM

## 2024-05-13 DIAGNOSIS — Z00.00 MEDICARE ANNUAL WELLNESS VISIT, SUBSEQUENT: Primary | ICD-10-CM

## 2024-05-13 DIAGNOSIS — E66.9 OBESITY (BMI 30-39.9): ICD-10-CM

## 2024-05-13 DIAGNOSIS — R33.8 BENIGN PROSTATIC HYPERPLASIA WITH URINARY RETENTION: ICD-10-CM

## 2024-05-13 DIAGNOSIS — H93.12 TINNITUS, LEFT: ICD-10-CM

## 2024-05-13 DIAGNOSIS — N40.1 BENIGN PROSTATIC HYPERPLASIA WITH URINARY RETENTION: ICD-10-CM

## 2024-05-13 DIAGNOSIS — Z00.00 MEDICARE ANNUAL WELLNESS VISIT, SUBSEQUENT: ICD-10-CM

## 2024-05-13 DIAGNOSIS — R93.89 ABNORMAL CXR: ICD-10-CM

## 2024-05-13 DIAGNOSIS — Z12.11 COLON CANCER SCREENING: ICD-10-CM

## 2024-05-13 PROBLEM — H25.9 AGE-RELATED CATARACT OF BOTH EYES: Status: RESOLVED | Noted: 2023-06-14 | Resolved: 2024-05-13

## 2024-05-13 LAB
ALBUMIN SERPL-MCNC: 4.1 G/DL (ref 3.2–4.8)
ALBUMIN/GLOB SERPL: 1.5 {RATIO} (ref 1–2)
ALP LIVER SERPL-CCNC: 117 U/L
ALT SERPL-CCNC: 12 U/L
ANION GAP SERPL CALC-SCNC: 6 MMOL/L (ref 0–18)
AST SERPL-CCNC: 25 U/L (ref ?–34)
BASOPHILS # BLD AUTO: 0.05 X10(3) UL (ref 0–0.2)
BASOPHILS NFR BLD AUTO: 0.6 %
BILIRUB SERPL-MCNC: 0.8 MG/DL (ref 0.2–1.1)
BUN BLD-MCNC: 16 MG/DL (ref 9–23)
BUN/CREAT SERPL: 17.6 (ref 10–20)
CALCIUM BLD-MCNC: 9.4 MG/DL (ref 8.7–10.4)
CHLORIDE SERPL-SCNC: 107 MMOL/L (ref 98–112)
CHOLEST SERPL-MCNC: 170 MG/DL (ref ?–200)
CO2 SERPL-SCNC: 27 MMOL/L (ref 21–32)
CREAT BLD-MCNC: 0.91 MG/DL
DEPRECATED RDW RBC AUTO: 42.5 FL (ref 35.1–46.3)
EGFRCR SERPLBLD CKD-EPI 2021: 87 ML/MIN/1.73M2 (ref 60–?)
EOSINOPHIL # BLD AUTO: 0.38 X10(3) UL (ref 0–0.7)
EOSINOPHIL NFR BLD AUTO: 4.3 %
ERYTHROCYTE [DISTWIDTH] IN BLOOD BY AUTOMATED COUNT: 13 % (ref 11–15)
FASTING PATIENT LIPID ANSWER: YES
FASTING STATUS PATIENT QL REPORTED: YES
GLOBULIN PLAS-MCNC: 2.8 G/DL (ref 2–3.5)
GLUCOSE BLD-MCNC: 84 MG/DL (ref 70–99)
HCT VFR BLD AUTO: 44.3 %
HDLC SERPL-MCNC: 43 MG/DL (ref 40–59)
HGB BLD-MCNC: 14.2 G/DL
IMM GRANULOCYTES # BLD AUTO: 0.04 X10(3) UL (ref 0–1)
IMM GRANULOCYTES NFR BLD: 0.5 %
LDLC SERPL CALC-MCNC: 112 MG/DL (ref ?–100)
LYMPHOCYTES # BLD AUTO: 1.43 X10(3) UL (ref 1–4)
LYMPHOCYTES NFR BLD AUTO: 16.2 %
MCH RBC QN AUTO: 28.7 PG (ref 26–34)
MCHC RBC AUTO-ENTMCNC: 32.1 G/DL (ref 31–37)
MCV RBC AUTO: 89.5 FL
MONOCYTES # BLD AUTO: 0.84 X10(3) UL (ref 0.1–1)
MONOCYTES NFR BLD AUTO: 9.5 %
NEUTROPHILS # BLD AUTO: 6.07 X10 (3) UL (ref 1.5–7.7)
NEUTROPHILS # BLD AUTO: 6.07 X10(3) UL (ref 1.5–7.7)
NEUTROPHILS NFR BLD AUTO: 68.9 %
NONHDLC SERPL-MCNC: 127 MG/DL (ref ?–130)
OSMOLALITY SERPL CALC.SUM OF ELEC: 290 MOSM/KG (ref 275–295)
PLATELET # BLD AUTO: 131 10(3)UL (ref 150–450)
POTASSIUM SERPL-SCNC: 4.1 MMOL/L (ref 3.5–5.1)
PROT SERPL-MCNC: 6.9 G/DL (ref 5.7–8.2)
RBC # BLD AUTO: 4.95 X10(6)UL
SODIUM SERPL-SCNC: 140 MMOL/L (ref 136–145)
TESTOST SERPL-MCNC: 235.38 NG/DL
TRIGL SERPL-MCNC: 77 MG/DL (ref 30–149)
TSI SER-ACNC: 1.41 MIU/ML (ref 0.55–4.78)
VLDLC SERPL CALC-MCNC: 13 MG/DL (ref 0–30)
WBC # BLD AUTO: 8.8 X10(3) UL (ref 4–11)

## 2024-05-13 PROCEDURE — 71046 X-RAY EXAM CHEST 2 VIEWS: CPT | Performed by: INTERNAL MEDICINE

## 2024-05-13 PROCEDURE — 85025 COMPLETE CBC W/AUTO DIFF WBC: CPT

## 2024-05-13 PROCEDURE — 84403 ASSAY OF TOTAL TESTOSTERONE: CPT

## 2024-05-13 PROCEDURE — 84443 ASSAY THYROID STIM HORMONE: CPT

## 2024-05-13 PROCEDURE — 80053 COMPREHEN METABOLIC PANEL: CPT

## 2024-05-13 PROCEDURE — 36415 COLL VENOUS BLD VENIPUNCTURE: CPT

## 2024-05-13 PROCEDURE — 80061 LIPID PANEL: CPT

## 2024-05-13 RX ORDER — METHYLPREDNISOLONE 4 MG/1
TABLET ORAL
Qty: 1 EACH | Refills: 0 | Status: SHIPPED | OUTPATIENT
Start: 2024-05-13

## 2024-05-13 RX ORDER — CHLORTHALIDONE 25 MG/1
12.5 TABLET ORAL DAILY
Qty: 45 TABLET | Refills: 1 | Status: SHIPPED | OUTPATIENT
Start: 2024-05-13

## 2024-05-13 RX ORDER — NAPROXEN 500 MG/1
500 TABLET ORAL 2 TIMES DAILY WITH MEALS
Qty: 30 TABLET | Refills: 0 | Status: SHIPPED | OUTPATIENT
Start: 2024-05-13

## 2024-05-13 RX ORDER — DIETHYLPROPION HYDROCHLORIDE 25 MG/1
1 TABLET ORAL DAILY
COMMUNITY
Start: 2024-03-24

## 2024-05-13 NOTE — PROGRESS NOTES
Subjective:   Marino Lozano is a 79 year old male who presents for a Medicare Subsequent Annual Wellness visit (Pt already had Initial Annual Wellness) and scheduled follow up of multiple significant but stable problems.       History/Other:   Fall Risk Assessment:   He has been screened for Falls and is low risk.      Cognitive Assessment:   He had a completely normal cognitive assessment - see flowsheet entries     Functional Ability/Status:   Marino Lozano has a completely normal functional assessment. See flowsheet for details.      Depression Screening (PHQ-2/PHQ-9): PHQ-2 SCORE: 0  , done 5/13/2024            Advanced Directives:   He does have a Living Will but we do NOT have it on file in Epic.    He does have a POA but we do NOT have it on file in [facility].    Patient has Advance Care Planning documents present in EMR. Reviewed documents with patient (and family/surrogate if present).      Patient Active Problem List   Diagnosis    Essential hypertension    Mixed hyperlipidemia    Colon cancer screening    Vitamin D deficiency    Erectile dysfunction    Obesity due to excess calories    Obesity (BMI 30-39.9)    Benign prostatic hyperplasia with urinary retention    Primary osteoarthritis of one knee, right    Primary osteoarthritis of both knees     Allergies:  He is allergic to seasonal.    Current Medications:  Outpatient Medications Marked as Taking for the 5/13/24 encounter (Office Visit) with Gucci Castro MD   Medication Sig    Diethylpropion HCl 25 MG Oral Tab Take 1 tablet by mouth daily.    naproxen 500 MG Oral Tab Take 1 tablet (500 mg total) by mouth 2 (two) times daily with meals.    methylPREDNISolone (MEDROL) 4 MG Oral Tablet Therapy Pack As directed.    chlorthalidone 25 MG Oral Tab Take 0.5 tablets (12.5 mg total) by mouth daily.    hydrALAZINE 10 MG Oral Tab Take 1 tablet (10 mg total) by mouth 3 (three) times daily.    amLODIPine 10 MG Oral Tab Take 1 tablet (10 mg total) by  mouth daily.    losartan 50 MG Oral Tab Take 2 tablets (100 mg total) by mouth daily.    naproxen 500 MG Oral Tab     Clobetasol Propionate 0.05 % External Gel Apply 1 Application. topically 2 (two) times daily. Apply to affected area as needed.    atorvastatin 10 MG Oral Tab Take 1 tablet (10 mg total) by mouth nightly.    Vardenafil HCl 20 MG Oral Tab Take 1 tablet (20 mg total) by mouth daily as needed. 30 MINS BEFORE SEX    SYMBICORT 160-4.5 MCG/ACT Inhalation Aerosol INHALE 2 PUFFS INTO THE LUNGS 2 (TWO) TIMES DAILY.       Medical History:  He  has a past medical history of Asthma (HCC), Asymptomatic gallstones, Atherosclerosis of coronary artery, BPH (benign prostatic hyperplasia), BPH with elevated PSA, Cataract, Coronary atherosclerosis, Essential hypertension, Hay fever, High blood pressure, High cholesterol, motion sickness, Hyperlipidemia, Obesity (BMI 30-39.9), PONV (postoperative nausea and vomiting), Pulmonary nodule (02/26/2018), and Visual impairment.  Surgical History:  He  has a past surgical history that includes appendectomy; hernia surgery (Bilateral); Cataract extraction w/ intraocular lens implant (Right, 08/25/2021); Cataract extraction w/ intraocular lens implant (Left, 09/08/2021); cataract; colonoscopy; colonoscopy; and colonoscopy.   Family History:  His family history includes Cancer in his brother and father; Dementia in his mother; Hypertension in his brother and father; Other in his sister.  Social History:  He  reports that he has quit smoking. His smoking use included cigarettes. He has a 5 pack-year smoking history. He has been exposed to tobacco smoke. He has never used smokeless tobacco. He reports current alcohol use. He reports that he does not use drugs.    Tobacco:  He smoked tobacco in the past but quit greater than 12 months ago.  Social History     Tobacco Use   Smoking Status Former    Current packs/day: 0.50    Average packs/day: 0.5 packs/day for 10.0 years (5.0 ttl  pk-yrs)    Types: Cigarettes    Passive exposure: Past   Smokeless Tobacco Never        CAGE Alcohol Screen:   CAGE screening score of 0 on 5/6/2024, showing low risk of alcohol abuse.      Patient Care Team:  Gucci Castro MD as PCP - General (Internal Medicine)  Angel Cardoza MD as Consulting Physician (BARIATRICS)    Review of Systems  GENERAL: feels well otherwise  SKIN: denies any unusual skin lesions  EYES: denies blurred vision or double vision  HEENT: denies nasal congestion, sinus pain or ST  LUNGS: denies shortness of breath with exertion  CARDIOVASCULAR: denies chest pain on exertion; no pnd/orthopnea  GI: denies abdominal pain, denies heartburn; no hematochezia/hematemesis   : 1 per night nocturia, no complaint of urinary incontinence; no hematuria  MUSCULOSKELETAL: denies back pain  NEURO: denies headaches  PSYCHE: denies depression or anxiety  HEMATOLOGIC: denies hx of anemia  ENDOCRINE: denies thyroid history  ALL/ASTHMA:  hx of allergy  but  asthma    Objective:   Physical Exam  General Appearance:  Alert, cooperative, no distress, appears stated age   Head:  Normocephalic, without obvious abnormality, atraumatic   Eyes:  PERRL, conjunctiva/corneas clear, EOM's intact, both eyes   Ears:  Normal TM's and external ear canals, both ears   Nose: Nares normal, septum midline, mucosa normal, no drainage or sinus tenderness   Throat: Lips, mucosa, and tongue normal; teeth and gums normal   Neck: Supple, symmetrical, trachea midline, no adenopathy, thyroid: not enlarged, symmetric, no tenderness/mass/nodules, no carotid bruit or JVD   Back:   Symmetric, no curvature, ROM normal, no CVA tenderness   Lungs:   Clear to auscultation bilaterally, respirations unlabored   Chest Wall:  No tenderness or deformity   Heart:  Regular rate and rhythm, S1, S2 normal, no murmur, rub or gallop   Abdomen:   Soft, non-tender, bowel sounds active all four quadrants,  no masses, no organomegaly   Genitalia: deferred    Rectal: deferred   Extremities: Extremities normal, atraumatic, no cyanosis or edema   Pulses: 2+ and symmetric   Skin: Skin color, texture, turgor normal, no rashes or lesions   Lymph nodes: Cervical, supraclavicular, nodes normal   Neurologic: Normal     /80   Pulse 64   Ht 5' 4\" (1.626 m)   Wt 210 lb (95.3 kg)   BMI 36.05 kg/m²  Estimated body mass index is 36.05 kg/m² as calculated from the following:    Height as of this encounter: 5' 4\" (1.626 m).    Weight as of this encounter: 210 lb (95.3 kg).    Medicare Hearing Assessment:   Hearing Screening    Time taken: 5/13/2024  8:58 AM  Entry User: Diana Jurado CMA  Screening Method: Finger Rub  Finger Rub Result: Pass             Assessment & Plan:   Marino Lozano is a 79 year old male who presents for a Medicare Assessment.     (Z00.00) Medicare annual wellness visit, subsequent  (primary encounter diagnosis)  Plan: CBC With Differential With Platelet, Comp         Metabolic Panel (14), Lipid Panel, TSH W Reflex        To Free T4        Routine labs ordered. Advised to get his shingrix and covid booster.     (I10) Essential hypertension  Plan: bp was elevated and even at home. Discussed with pt will switch hydrochlorothiazide to chlorthalidone 12.5mg po daily and continue current bp meds. Monitor bp at home and call back if 145/90 and above.     (E78.2) Mixed hyperlipidemia  Plan: check lipid panel; continue with low chol diet and chol med.     (N40.1,  R33.8) Benign prostatic hyperplasia with urinary retention  Plan: pt to ffupw with urologist;     (Z12.11) Colon cancer screening  Plan: pt  is current with his colonoscpy.     (N52.9) Erectile dysfunction, unspecified erectile dysfunction type  Plan: Testosterone Total [E]        Check testosterone level.     (M17.11) Primary osteoarthritis of one knee, right  Plan: pt has been ff by ortho, had received steroid shot and synvsic shot already.     (Z12.5) Prostate cancer screening  Plan: PSA  Total, Screen        Check psa.     (R93.89) Abnormal CXR  Plan: XR CHEST PA + LAT CHEST (CPT=71046)        Pt was seen in [facility] fewmonths ago and diagnosed clinically with pneumonia then,  pt has not had ffup cxr which was recommended by raditologist;  ordered CXr.     (R53.83) Other fatigue  Plan: TSH W Reflex To Free T4        Pt had complained of fatigue; will check TFT and pt had requested testosterone level     (H93.12) Tinnitus, left  Plan: ENT Referral - In Network        Complained of unilateral tinnitus; refer to ENT.     (E66.9) Obesity (BMI 30-39.9)  Plan: pt already ff by weight clinic.      The patient indicates understanding of these issues and agrees to the plan.  Reinforced healthy diet, lifestyle, and exercise.      No follow-ups on file.     Gucci Castro MD, 5/13/2024     Supplementary Documentation:   General Health:  In the past six months, have you lost more than 10 pounds without trying?: 2 - No  Has your appetite been poor?: No  Type of Diet: Balanced  How does the patient maintain a good energy level?: Other  How would you describe your daily physical activity?: Light  How would you describe your current health state?: Fair  How do you maintain positive mental well-being?: Social Interaction;Visiting Friends;Visiting Family  On a scale of 0 to 10, with 0 being no pain and 10 being severe pain, what is your pain level?: 6 - (Moderate)  In the past six months, have you experienced urine leakage?: 0-No  At any time do you feel concerned for the safety/well-being of yourself and/or your children, in your home or elsewhere?: No  Have you had any immunizations at another office such as Influenza, Hepatitis B, Tetanus, or Pneumococcal?: No        Marino Lozano's SCREENING SCHEDULE   Tests on this list are recommended by your physician but may not be covered, or covered at this frequency, by your insurer.   Please check with your insurance carrier before scheduling to verify coverage.    PREVENTATIVE SERVICES FREQUENCY &  COVERAGE DETAILS LAST COMPLETION DATE   Diabetes Screening    Fasting Blood Sugar / Glucose    One screening every 12 months if never tested or if previously tested but not diagnosed with pre-diabetes   One screening every 6 months if diagnosed with pre-diabetes Lab Results   Component Value Date    GLU 84 05/13/2024        Cardiovascular Disease Screening    Lipid Panel  Cholesterol  Lipoprotein (HDL)  Triglycerides Covered every 5 years for all Medicare beneficiaries without apparent signs or symptoms of cardiovascular disease Lab Results   Component Value Date    CHOLEST 170 05/13/2024    HDL 43 05/13/2024     (H) 05/13/2024    TRIG 77 05/13/2024         Electrocardiogram (EKG)   Covered if needed at Welcome to Medicare, and non-screening if indicated for medical reasons 12/20/2022      Ultrasound Screening for Abdominal Aortic Aneurysm (AAA) Covered once in a lifetime for one of the following risk factors    Men who are 65-75 years old and have ever smoked    Anyone with a family history -     Colorectal Cancer Screening  Covered for ages 50-85; only need ONE of the following:    Colonoscopy   Covered every 10 years    Covered every 2 years if patient is at high risk or previous colonoscopy was abnormal 10/21/2015    Health Maintenance   Topic Date Due    Colorectal Cancer Screening  Discontinued       Flexible Sigmoidoscopy   Covered every 4 years -    Fecal Occult Blood Test Covered annually -   Prostate Cancer Screening    Prostate-Specific Antigen (PSA) Annually Lab Results   Component Value Date    PSA 4.5 (H) 04/05/2018     There are no preventive care reminders to display for this patient.   Immunizations    Influenza Covered once per flu season  Please get every year 09/22/2023  No recommendations at this time    Pneumococcal Each vaccine (Itihtro15 & [id number]) covered once after 65 Prevnar 13: 04/24/2019    [id number]: 10/01/2020     No recommendations at  this time    Hepatitis B One screening covered for patients with certain risk factors   -  No recommendations at this time    Tetanus Toxoid Not covered by Medicare Part B unless medically necessary (cut with metal); may be covered with your pharmacy prescription benefits -    Tetanus, Diptheria and Pertusis TD and TDaP Not covered by Medicare Part B -  No recommendations at this time    Zoster Not covered by Medicare Part B; may be covered with your pharmacy  prescription benefits -  Zoster Vaccines(1 of 2) Never done     Annual Monitoring of Persistent Medications (ACE/ARB, digoxin diuretics, anticonvulsants)    Potassium Annually Lab Results   Component Value Date    K 4.1 05/13/2024         Creatinine   Annually Lab Results   Component Value Date    CREATSERUM 0.91 05/13/2024         BUN Annually Lab Results   Component Value Date    BUN 16 05/13/2024       Drug Serum Conc Annually No results found for: \"DIGOXIN\", \"DIG\", \"VALP\"

## 2024-05-19 ENCOUNTER — TELEPHONE (OUTPATIENT)
Dept: INTERNAL MEDICINE CLINIC | Facility: CLINIC | Age: 79
End: 2024-05-19

## 2024-05-19 DIAGNOSIS — D69.6 THROMBOCYTOPENIA (HCC): Primary | ICD-10-CM

## 2024-05-20 ENCOUNTER — ORDER TRANSCRIPTION (OUTPATIENT)
Dept: ADMINISTRATIVE | Facility: HOSPITAL | Age: 79
End: 2024-05-20

## 2024-05-20 DIAGNOSIS — Z13.9 ENCOUNTER FOR SCREENING: Primary | ICD-10-CM

## 2024-06-13 ENCOUNTER — HOSPITAL ENCOUNTER (OUTPATIENT)
Dept: CT IMAGING | Age: 79
Discharge: HOME | End: 2024-06-13
Attending: INTERNAL MEDICINE

## 2024-06-13 DIAGNOSIS — Z13.9 ENCOUNTER FOR SCREENING: ICD-10-CM

## 2024-06-26 ENCOUNTER — TELEPHONE (OUTPATIENT)
Dept: INTERNAL MEDICINE CLINIC | Facility: CLINIC | Age: 79
End: 2024-06-26

## 2024-06-26 DIAGNOSIS — R93.1 AGATSTON CAC SCORE 200-399: ICD-10-CM

## 2024-06-26 DIAGNOSIS — R91.1 SOLID NODULE OF LUNG GREATER THAN 8 MM IN DIAMETER: Primary | ICD-10-CM

## 2024-07-09 ENCOUNTER — HOSPITAL ENCOUNTER (OUTPATIENT)
Dept: NUCLEAR MEDICINE | Facility: HOSPITAL | Age: 79
Discharge: HOME | End: 2024-07-09
Attending: INTERNAL MEDICINE
Payer: MEDICARE

## 2024-07-09 DIAGNOSIS — R91.1 SOLID NODULE OF LUNG GREATER THAN 8 MM IN DIAMETER: ICD-10-CM

## 2024-07-09 LAB — GLUCOSE BLDC GLUCOMTR-MCNC: 95 MG/DL (ref 70–99)

## 2024-07-09 PROCEDURE — 82962 GLUCOSE BLOOD TEST: CPT

## 2024-07-09 PROCEDURE — 78815 PET IMAGE W/CT SKULL-THIGH: CPT | Performed by: INTERNAL MEDICINE

## 2024-07-14 ENCOUNTER — TELEPHONE (OUTPATIENT)
Dept: INTERNAL MEDICINE CLINIC | Facility: CLINIC | Age: 79
End: 2024-07-14

## 2024-07-14 DIAGNOSIS — N28.9 LESION OF LEFT NATIVE KIDNEY: ICD-10-CM

## 2024-07-14 DIAGNOSIS — R94.8 ABNORMAL PET SCAN OF COLON: Primary | ICD-10-CM

## 2024-07-15 ENCOUNTER — TELEPHONE (OUTPATIENT)
Facility: CLINIC | Age: 79
End: 2024-07-15

## 2024-07-15 ENCOUNTER — OFFICE VISIT (OUTPATIENT)
Facility: CLINIC | Age: 79
End: 2024-07-15
Payer: MEDICARE

## 2024-07-15 VITALS
SYSTOLIC BLOOD PRESSURE: 164 MMHG | WEIGHT: 213 LBS | HEART RATE: 69 BPM | DIASTOLIC BLOOD PRESSURE: 76 MMHG | BODY MASS INDEX: 36.37 KG/M2 | HEIGHT: 64 IN

## 2024-07-15 DIAGNOSIS — Z12.11 COLON CANCER SCREENING: Primary | ICD-10-CM

## 2024-07-15 DIAGNOSIS — R94.8 ABNORMAL PET SCAN OF COLON: Primary | ICD-10-CM

## 2024-07-15 DIAGNOSIS — R94.8 ABNORMAL PET SCAN OF COLON: ICD-10-CM

## 2024-07-15 RX ORDER — SODIUM, POTASSIUM,MAG SULFATES 17.5-3.13G
SOLUTION, RECONSTITUTED, ORAL ORAL
Qty: 1 EACH | Refills: 0 | Status: SHIPPED | OUTPATIENT
Start: 2024-07-15

## 2024-07-15 RX ORDER — ROSUVASTATIN CALCIUM 10 MG/1
10 TABLET, COATED ORAL NIGHTLY
COMMUNITY
Start: 2024-07-11

## 2024-07-15 NOTE — PATIENT INSTRUCTIONS
1. Schedule colonoscopy with soonest available provider  [Diagnosis: crc screening, abnormal PET]    2.  bowel prep from pharmacy (split suprep)    3. Hold 72 hours prior for Vardenafil.     4. Read all bowel prep instructions carefully. Bowel prep instructions can also be found online at:  [email or web address].org/giprep     5. AVOID seeds, nuts, popcorn, raw fruits and vegetables for 3 days before procedure    6. You MAY need to go for COVID testing 72 hours before procedure. The testing team will call you a few days before your procedure to discuss with you if testing is required. If you are asked to go for COVID testing and do not completed the test, the procedure cannot be performed.     7. If you start any NEW medication after your visit today, please notify us. Certain medications (like iron or weight loss medications) will need to be held before the procedure, or the procedure cannot be performed safely.

## 2024-07-15 NOTE — PROGRESS NOTES
[facility] - Gastroenterology                                                                                                               Reason for consult:   Chief Complaint   Patient presents with    Colonoscopy Screening     Abnormal pet scan       Requesting physician or provider: Gucci Castro MD      HPI:   Marino Lozano is a 79 year old year-old male with history of HTN, HLD, BMI 36, OA, ED COPD who presents for abnormal PET scan. Patient had abnormal CXR in February, CT calcium score notable for LLL pulmonary nodule. PET scn ordered due to hx of tobacco use. PET scan 7/9 noted a LLL pulmonary nodule most likely a hamartoma, 1.8 cm renal lesion and abnormal uptake in the sigmoid colon and small surrounding mesenteric lymph nodes. Advised for colonoscopy. Patient presents for follow up.     Has BM daily. Can have episodes of constipation were he has more straining with BM.Notes hx of bright red blood maybe 5 years ago. Now has not noted any blood in stool. No melena.     Weight has been stable. Appetite good. he denies acid reflux and/or heartburn. he denies dysphagia, odynophagia and/or globus. he denies abdominal pain. he denies nausea and/or vomiting. he denies bloating.    He notes he did have intermittent lower abdominal pain about 2 weeks ago, no diarrhea or fevers or chills. Since abdominal pain has resolved.     NSAIDS:  none  Tobacco: former smoker  Alcohol: social   Marijuana: none  Illicit drugs: none    FH GI malignancy- none  FH celiac dz- none  FH liver dz- none  FH IBD- none    Prior endoscopies:  12/2021- Colonoscopy with Dr. Truong  Colon polyps   Diverticulosis   Advised for 5 year recall    Wt Readings from Last 6 Encounters:   07/15/24 213 lb (96.6 kg)   05/13/24 210 lb (95.3 kg)   02/23/24 208 lb (94.3 kg)   01/03/24 200 lb (90.7 kg)   06/21/23 204 lb (92.5 kg)   06/13/23 202 lb 12.8 oz (92 kg)        History, Medications,  Allergies, ROS:      Past Medical History:    Asthma (HCC)    Asymptomatic gallstones    Atherosclerosis of coronary artery    BPH (benign prostatic hyperplasia)    BPH with elevated PSA    Cataract    Coronary atherosclerosis    Essential hypertension    Hay fever    High blood pressure    High cholesterol    Hx of motion sickness    Hyperlipidemia    Obesity (BMI 30-39.9)    PONV (postoperative nausea and vomiting)    Pulmonary nodule    Visual impairment    glasses      Past Surgical History:   Procedure Laterality Date    Appendectomy      Cataract      Cataract extraction w/ intraocular lens implant Right 08/25/2021    TASIA CE LENSX/ORA TORIC MAC OD SN6AT5 +13.5    Cataract extraction w/ intraocular lens implant Left 09/08/2021    TASIA CE LENSX/ORA TRAD MAC OS    Colonoscopy      2016 rectal polyp    Colonoscopy      2021 polyp dr larry repeat in 5 yrs    Colonoscopy      Hernia surgery Bilateral     inguinal hernia      Family Hx:   Family History   Problem Relation Age of Onset    Cancer Father         brain    Hypertension Father     Dementia Mother         Alzheimers    Other (Other) Sister         RA    Hypertension Brother     Cancer Brother         brain cancer 15 y/o      Social History:   Social History     Socioeconomic History    Marital status:    Tobacco Use    Smoking status: Former     Current packs/day: 0.50     Average packs/day: 0.5 packs/day for 10.0 years (5.0 ttl pk-yrs)     Types: Cigarettes     Passive exposure: Past    Smokeless tobacco: Never   Vaping Use    Vaping status: Never Used   Substance and Sexual Activity    Alcohol use: Yes     Comment: 2 x monthly/socially    Drug use: Never   Other Topics Concern    Caffeine Concern Yes     Comment: Coffee 2 cups daily     Social Determinants of Health      [facility]        Medications (Active prior to today's visit):  Current Outpatient Medications   Medication Sig Dispense Refill    naproxen 500 MG  Oral Tab Take 1 tablet (500 mg total) by mouth 2 (two) times daily with meals. 30 tablet 0    chlorthalidone 25 MG Oral Tab Take 0.5 tablets (12.5 mg total) by mouth daily. 45 tablet 1    hydrALAZINE 10 MG Oral Tab Take 1 tablet (10 mg total) by mouth 3 (three) times daily. 270 tablet 3    amLODIPine 10 MG Oral Tab Take 1 tablet (10 mg total) by mouth daily. 90 tablet 3    losartan 50 MG Oral Tab Take 2 tablets (100 mg total) by mouth daily. 180 tablet 3    naproxen 500 MG Oral Tab       Clobetasol Propionate 0.05 % External Gel Apply 1 Application. topically 2 (two) times daily. Apply to affected area as needed. 60 g 3    Vardenafil HCl 20 MG Oral Tab Take 1 tablet (20 mg total) by mouth daily as needed. 30 MINS BEFORE SEX 30 tablet 2    SYMBICORT 160-4.5 MCG/ACT Inhalation Aerosol INHALE 2 PUFFS INTO THE LUNGS 2 (TWO) TIMES DAILY. 10 Inhaler 0    rosuvastatin 10 MG Oral Tab Take 1 tablet (10 mg total) by mouth nightly.      Diethylpropion HCl 25 MG Oral Tab Take 1 tablet by mouth daily.      methylPREDNISolone (MEDROL) 4 MG Oral Tablet Therapy Pack As directed. (Patient not taking: Reported on 7/15/2024) 1 each 0    guaiFENesin-codeine 100-10 MG/5ML Oral Solution Take 5 mL by mouth every 6 (six) hours as needed for cough. (Patient not taking: Reported on 5/13/2024) 240 mL 0    fluticasone propionate 50 MCG/ACT Nasal Suspension 2 sprays by Each Nare route daily. (Patient not taking: Reported on 5/13/2024) 3 each 1    atorvastatin 10 MG Oral Tab Take 1 tablet (10 mg total) by mouth nightly. 90 tablet 1       Allergies:  Allergies   Allergen Reactions    Seasonal        ROS:   CONSTITUTIONAL: negative for fevers, chills, sweats and weight loss  EYES Negative for red eyes, yellow eyes, changes in vision  HEENT: Negative for dysphagia and hoarseness  RESPIRATORY: Negative for cough and shortness of breath  CARDIOVASCULAR: Negative for chest pain, palpitations  GASTROINTESTINAL: See HPI  GENITOURINARY: Negative for  dysuria and frequency  MUSCULOSKELETAL: Negative for arthralgias and myalgias  NEUROLOGICAL: Negative for dizziness and headaches  BEHAVIOR/PSYCH: Negative for anxiety and poor appetite    PHYSICAL EXAM:   Height 5' 4\" (1.626 m), weight 213 lb (96.6 kg).    GEN: WD/WN, NAD  HEENT: Supple symmetrical, trachea midline  CV: RRR, the extremities are warm and well perfused   LUNGS: No increased work of breathing  ABDOMEN: No scars, normal bowel sounds, soft, non-tender, non-distended no rebound or guarding, no masses, no hepatomegaly  MSK: No redness, no warmth, no swelling of joints  SKIN: No jaundice, no erythema, no rashes  HEMATOLOGIC: No bleeding, no bruising  NEURO: Alert and interactive, normal gait    Labs/Imaging/Procedures:     Patient's pertinent labs and imaging were reviewed and discussed with patient today.     Lab Results   Component Value Date    WBC 8.8 05/13/2024    RBC 4.95 05/13/2024    HGB 14.2 05/13/2024    HCT 44.3 05/13/2024    MCV 89.5 05/13/2024    MCH 28.7 05/13/2024    MCHC 32.1 05/13/2024    RDW 13.0 05/13/2024    .0 (L) 05/13/2024    MPV 10.6 (H) 04/05/2018        Lab Results   Component Value Date    GLU 84 05/13/2024    BUN 16 05/13/2024    BUNCREA 17.6 05/13/2024    CREATSERUM 0.91 05/13/2024    ANIONGAP 6 05/13/2024    GFR >60 06/02/2016    GFRNAA 82 05/27/2022    GFRAA 95 05/27/2022    CA 9.4 05/13/2024    OSMOCALC 290 05/13/2024    ALKPHO 117 05/13/2024    AST 25 05/13/2024    ALT 12 05/13/2024    ALKPHOS 92 06/16/2016    BILT 0.8 05/13/2024    TP 6.9 05/13/2024    ALB 4.1 05/13/2024    GLOBULIN 2.8 05/13/2024    AGRATIO 1.5 03/21/2016     05/13/2024    K 4.1 05/13/2024     05/13/2024    CO2 27.0 05/13/2024        PET STANDARD BODY SCAN (ONCOLOGY) (CPT=78815)    Result Date: 7/9/2024  PROCEDURE: PET/CT STANDARD BODY SCAN (CPT=78815)  COMPARISON: [facility], CT CHEST W CONTRAST, 6/23/2016, 9:25 AM.  [facility], CT UROGRAM(W+WO) W/3D (CPT=74178/30833), 6/20/2022, 9:00 AM.  INDICATIONS:  R91.1 Solid nodule of lung greater than 8 mm in diameter  TECHNIQUE: PET/CT study of the head and torso, done with 13.1 millicuries of Fluorine-18 FDG (Fluorodeoxyglucose) injected into a right forearm vein.  Low dose non-contrast CT scanning was performed using a dedicated integrated PET/CT scanner for attenuation correction and anatomic localization only.  FASTING PATIENT BLOOD GLUCOSE: 95 mg/dL.  UPTAKE TIME: 93 minutes.   FINDINGS: Mediastinal blood pool is 2.1 max SUV, and hepatic blood pool is 3.6 max SUV.  HEAD/NECK: No pathologic FDG activity.  LUNGS: 1 cm left lower lobe pulmonary nodule is not FDG avid.  No pathologic FDG activity.  No FDG avid pulmonary nodules.  MEDIASTINUM/JULIÁN: Normal.  No pathologic FDG activity.  CHEST WALL/AXILLA: A benign left periscapular lipoma.  No pathologic FDG activity.  ABDOMEN/PELVIS: Circumferential wall thickening in redundant sigmoid colon with prominent FDG uptake (SUV max 22.6).  Subcentimeter lymph nodes in the adjacent mesentery with SUV max up to 5.0.  Colonic diverticulosis.  Marked asymmetric prostate enlargement accentuated towards the right without a significant change.  Dependent bladder calculus measuring 2.1 cm is unchanged.  Calcified gallstone is unchanged.  A 1.8 cm exophytic left lower pole renal lesion measuring 37 Hounsfield units previously measured 1.4 cm. Few small renal cysts requiring no additional evaluation. MUSCULOSKELETAL: Normal.  No pathologic FDG activity.  No suspicious lesions on CT imaging.               CONCLUSION:  1. 1 cm non FDG avid left lower lobe pulmonary nodule contains fat and is compatible with hamartoma. 2. A 1.8 cm left lower pole renal lesion is probably a solid lesion.  Follow-up ultrasound recommended. 3. Abnormal FDG uptake in the sigmoid colon and small surrounding mesenteric lymph nodes.  Findings could be malignancy or colitis.   Correlation with colonoscopy recommended.   fax  Dictated by (CST): Pelon Ness MD on 7/09/2024 at 1:21 PM     Finalized by (CST): Pelon Ness MD on 7/09/2024 at 1:59 PM                  .  ASSESSMENT/PLAN:   Marino Lozano is a 79 year old year-old male with history of HTN, HLD, BMI 36, OA, ED COPD who presents for abnormal PET scan. Patient had abnormal CXR in February, CT calcium score notable for LLL pulmonary nodule. PET scn ordered due to hx of tobacco use. PET scan 7/9 noted a LLL pulmonary nodule most likely a hamartoma, 1.8 cm renal lesion and abnormal uptake in the sigmoid colon and small surrounding mesenteric lymph nodes. Advised for colonoscopy. Patient presents for follow up.     #abnormal PET scan   #crc screening  -patient underwent work up for lung nodule  -incidental finding of abnormal uptake of sigmoid colon can mesenteric lymph nodes  -most recent CBC demonstrated hemoglobin of 14.2  -no weight loss or decreased appetite  -plan for colonoscopy at this time  -patient agreeable to plan, all questions answered    1. Schedule colonoscopy with soonest available provider  [Diagnosis: crc screening, abnormal PET]    2.  bowel prep from pharmacy (split suprep)    3. Hold 72 hours prior for Vardenafil.     4. Read all bowel prep instructions carefully. Bowel prep instructions can also be found online at:  [email or web address].org/giprep     5. AVOID seeds, nuts, popcorn, raw fruits and vegetables for 3 days before procedure    6. You MAY need to go for COVID testing 72 hours before procedure. The testing team will call you a few days before your procedure to discuss with you if testing is required. If you are asked to go for COVID testing and do not completed the test, the procedure cannot be performed.     7. If you start any NEW medication after your visit today, please notify us. Certain medications (like iron or weight loss medications) will need to be held before the procedure, or the  procedure cannot be performed safely.      Orders This Visit:  No orders of the defined types were placed in this encounter.      Meds This Visit:  Requested Prescriptions      No prescriptions requested or ordered in this encounter       Imaging & Referrals:  None    ENDOSCOPIC RISK BENEFIT DISCUSSION: I described the procedure in great detail with the patient. I discussed the risks and benefits, including but not limited to: bleeding, perforation, infection, anesthesia complications, and even death. Patient will be NPO after midnight and will have a person physically present at time of pick-up to drive patient home. Patient verbalized understanding and agrees to proceed with procedure as planned.      Susu Hunter PA-C   7/15/2024        This note was partially prepared using Dragon Medical voice recognition dictation software. As a result, errors may occur. When identified, these errors have been corrected. While every attempt is made to correct errors during dictation, discrepancies may still exist.

## 2024-07-15 NOTE — TELEPHONE ENCOUNTER
Spoke to patient    Scheduled patient for an OV today with Susu  Patient was agreeable    Location, date and time confirmed    Your Appointments      Monday July 15, 2024 2:30 PM  Consult with Susu Hunter PA-C  [facility] ([facility]) [facility] [address] [address]  [phone]

## 2024-07-15 NOTE — TELEPHONE ENCOUNTER
Patient called to make an appointment with a gastroenterologist due to having an abnormal PET scan. Patient was referred to see Dr. Stout. Patient was informed of Dr. Stout's first available appointment. Patient insisted on being seen by Dr. Stout. Please call.

## 2024-07-15 NOTE — TELEPHONE ENCOUNTER
Scheduled for:  Colonoscopy 86713  Provider Name:  Dr. Boateng  Date:  7/19/24  Location:[facility]  Sedation:  MAC  Time:  0900 Am ,(pt is aware that [facility] will call the day before to confirm arrival time)   Prep:  Suprep  Meds/Allergies Reconciled?:  Susu Hunter PA-C Reviewed  Diagnosis with codes: Colon cancer screening Z12.11,Abnormal pet scan of the colon R94.8  Was patient informed to call insurance with codes (Y/N):  Yes  Referral sent?:  Referral was sent at the time of electronic surgical scheduling.  [facility] or [facility] notified?:  I sent an electronic request to Endo Scheduling and received a confirmation today.  Medication Orders:  Pt is aware to NOT take iron pills, herbal meds and diet supplements for 7 days before exam. Also to NOT take any form of alcohol, recreational drugs and any forms of ED meds 24 hours before exam.   Hold 72 hours prior for Vardenafil.   Misc Orders:  Patient was informed about the new cancellation policy for his/her procedure. Patient was also given a copy of the cancellation policy at the time of the appointment and verbalized understanding.      Further instructions given by staff:  I provide prep instructions to patient at the time of the appointment and reviewed date and location, patient verbalized that he understood and is aware to call if he has any questions.

## 2024-07-19 PROCEDURE — 88305 TISSUE EXAM BY PATHOLOGIST: CPT | Performed by: INTERNAL MEDICINE

## 2024-08-16 ENCOUNTER — ORDER TRANSCRIPTION (OUTPATIENT)
Dept: ADMINISTRATIVE | Facility: HOSPITAL | Age: 79
End: 2024-08-16

## 2024-08-16 DIAGNOSIS — R93.1 ELEVATED CORONARY ARTERY CALCIUM SCORE: ICD-10-CM

## 2024-08-16 DIAGNOSIS — I10 BENIGN ESSENTIAL HTN: ICD-10-CM

## 2024-08-16 DIAGNOSIS — R06.02 SOB (SHORTNESS OF BREATH): ICD-10-CM

## 2024-08-16 DIAGNOSIS — R06.09 DOE (DYSPNEA ON EXERTION): Primary | ICD-10-CM

## 2024-08-16 DIAGNOSIS — R06.09 DYSPNEA ON EXERTION: ICD-10-CM

## 2024-08-19 ENCOUNTER — HOSPITAL ENCOUNTER (OUTPATIENT)
Dept: ULTRASOUND IMAGING | Age: 79
Discharge: HOME | End: 2024-08-19
Attending: INTERNAL MEDICINE
Payer: MEDICARE

## 2024-08-19 DIAGNOSIS — N28.9 LESION OF LEFT NATIVE KIDNEY: ICD-10-CM

## 2024-08-19 PROCEDURE — 76775 US EXAM ABDO BACK WALL LIM: CPT | Performed by: INTERNAL MEDICINE

## 2024-08-19 RX ORDER — AMLODIPINE BESYLATE 10 MG/1
10 TABLET ORAL DAILY
Qty: 90 TABLET | Refills: 3 | Status: SHIPPED | OUTPATIENT
Start: 2024-08-19

## 2024-08-19 RX ORDER — LOSARTAN POTASSIUM 50 MG/1
100 TABLET ORAL DAILY
Qty: 180 TABLET | Refills: 3 | Status: SHIPPED | OUTPATIENT
Start: 2024-08-19

## 2024-08-19 NOTE — TELEPHONE ENCOUNTER
Refill passed per [facility] protocols.    Requested Prescriptions   Pending Prescriptions Disp Refills    amLODIPine 10 MG Oral Tab [Pharmacy Med Name: AMLODIPINE BESYLATE 10MG TABLETS] 90 tablet 3     Sig: Take 1 tablet (10 mg total) by mouth daily.       Hypertension Medications Protocol Passed - 8/19/2024 10:40 AM        Passed - CMP or BMP in past 12 months        Passed - Last BP reading less than 140/90     BP Readings from Last 1 Encounters:   07/19/24 122/59               Passed - In person appointment or virtual visit in the past 12 mos or appointment in next 3 mos     Recent Outpatient Visits              1 month ago Abnormal PET scan of colon    [facility], [facility], Susu Kaiser PA-C    Office Visit    3 months ago Medicare annual wellness visit, subsequent    [facility], Gucci Culp MD    Office Visit    5 months ago Essential hypertension    [facility], [facility], Angel Rubio MD    Office Visit    1 year ago Essential hypertension    [facility], [facility], Angel Rubio MD    Office Visit    1 year ago Medicare annual wellness visit, subsequent    [facility] [place] Gucci Culp MD    Office Visit          Future Appointments           Provider Department     Today ADO [address] Ultrasound - [place]    Appt Notes: Checking for lesions, cancerous or otherwise      In 1 month [facility] RN RADIOLOGY 3; [facility] CT RM1 CARD [facility] CT    Appt Notes: Please scan order in when it arrives. 08/16/2024      In 1 month [facility] CT RM4 FFR [facility] CT                    Passed - EGFRCR or GFRNAA > 50     GFR Evaluation  EGFRCR: 87 , resulted on 5/13/2024            losartan 50 MG Oral Tab 180 tablet 3     Sig: Take 2 tablets (100 mg total) by mouth daily.       Hypertension Medications Protocol  Passed - 8/19/2024 10:40 AM        Passed - CMP or BMP in past 12 months        Passed - Last BP reading less than 140/90     BP Readings from Last 1 Encounters:   07/19/24 122/59               Passed - In person appointment or virtual visit in the past 12 mos or appointment in next 3 mos     Recent Outpatient Visits              1 month ago Abnormal PET scan of colon    [facility], [facility], Susu Kaiser PA-C    Office Visit    3 months ago Medicare annual wellness visit, subsequent    [facility], Gucci Culp MD    Office Visit    5 months ago Essential hypertension    [facility], Angel Rubio MD    Office Visit    1 year ago Essential hypertension    [facility], Angel Rubio MD    Office Visit    1 year ago Medicare annual wellness visit, subsequent    [facility], Gucci Culp MD    Office Visit          Future Appointments           Provider Department     Today [facility] [place] [address] [place] [address] Ultrasound - [place]    Appt Notes: Checking for lesions, cancerous or otherwise      In 1 month [facility] RN RADIOLOGY 3; [facility] CT RM1 CARD [facility] CT    Appt Notes: Please scan order in when it arrives. 08/16/2024      In 1 month [facility] CT RM4 FFR [facility] CT                    Passed - EGFRCR or GFRNAA > 50     GFR Evaluation  EGFRCR: 87 , resulted on 5/13/2024

## 2024-08-26 ENCOUNTER — MED REC SCAN ONLY (OUTPATIENT)
Dept: INTERNAL MEDICINE CLINIC | Facility: CLINIC | Age: 79
End: 2024-08-26

## 2024-09-14 NOTE — TELEPHONE ENCOUNTER
Protocol Failed/ No Protocol    Requested Prescriptions   Pending Prescriptions Disp Refills    Clobetasol Propionate 0.05 % External Gel 60 g 3     Sig: Apply 1 Application  topically 2 (two) times daily. Apply to affected area as needed.       There is no refill protocol information for this order          Future Appointments           Provider Department     In 1 week [facility] RN RADIOLOGY 3; [facility] CT RM1 CARD [facility] CT    Appt Notes: **  Please scan order in when it arrives. 08/16/2024      In 1 week [facility] CT RM4 FFR [facility] CT          Recent Outpatient Visits              2 months ago Abnormal PET scan of colon    [facility], Susu Kaiser PA-C    Office Visit    4 months ago Medicare annual wellness visit, subsequent    [facility], Gucci Culp MD    Office Visit    6 months ago Essential hypertension    [facility], Angel Rubio MD    Office Visit    1 year ago Essential hypertension    [facility], Angel Rubio MD    Office Visit    1 year ago Medicare annual wellness visit, subsequent    [facility], Gucci Culp MD    Office Visit

## 2024-09-16 RX ORDER — CLOBETASOL PROPIONATE 0.05 MG/G
1 GEL TOPICAL 2 TIMES DAILY
Qty: 60 G | Refills: 3 | Status: SHIPPED | OUTPATIENT
Start: 2024-09-16

## 2024-09-23 RX ORDER — METOPROLOL TARTRATE 50 MG
50 TABLET ORAL AS DIRECTED
COMMUNITY
Start: 2024-08-19

## 2024-09-24 ENCOUNTER — HOSPITAL ENCOUNTER (OUTPATIENT)
Dept: CT IMAGING | Facility: HOSPITAL | Age: 79
Discharge: HOME | End: 2024-09-24
Attending: INTERNAL MEDICINE
Payer: MEDICARE

## 2024-09-24 VITALS — HEIGHT: 64 IN | BODY MASS INDEX: 35.34 KG/M2 | WEIGHT: 207 LBS

## 2024-09-24 DIAGNOSIS — R06.09 DYSPNEA ON EXERTION: ICD-10-CM

## 2024-09-24 DIAGNOSIS — R06.09 DOE (DYSPNEA ON EXERTION): ICD-10-CM

## 2024-09-24 DIAGNOSIS — R93.1 ELEVATED CORONARY ARTERY CALCIUM SCORE: ICD-10-CM

## 2024-09-24 DIAGNOSIS — I10 BENIGN ESSENTIAL HTN: ICD-10-CM

## 2024-09-24 DIAGNOSIS — R06.02 SOB (SHORTNESS OF BREATH): ICD-10-CM

## 2024-09-24 LAB
CREAT BLD-MCNC: 1 MG/DL
EGFRCR SERPLBLD CKD-EPI 2021: 78 ML/MIN/1.73M2 (ref 60–?)

## 2024-09-24 PROCEDURE — 75580 N-INVAS EST C FFR SW ALY CTA: CPT | Performed by: INTERNAL MEDICINE

## 2024-09-24 PROCEDURE — 82565 ASSAY OF CREATININE: CPT

## 2024-09-24 PROCEDURE — 75574 CT ANGIO HRT W/3D IMAGE: CPT | Performed by: INTERNAL MEDICINE

## 2024-09-24 RX ORDER — NITROGLYCERIN 0.4 MG/1
0.4 TABLET SUBLINGUAL ONCE
Status: COMPLETED | OUTPATIENT
Start: 2024-09-24 | End: 2024-09-24

## 2024-09-24 RX ORDER — METOPROLOL TARTRATE 1 MG/ML
5 INJECTION, SOLUTION INTRAVENOUS SEE ADMIN INSTRUCTIONS
Status: DISCONTINUED | OUTPATIENT
Start: 2024-09-24 | End: 2024-09-26

## 2024-09-24 RX ORDER — METOPROLOL TARTRATE 25 MG/1
50 TABLET, FILM COATED ORAL ONCE AS NEEDED
Status: DISCONTINUED | OUTPATIENT
Start: 2024-09-24 | End: 2024-09-26

## 2024-09-24 RX ORDER — DILTIAZEM HYDROCHLORIDE 5 MG/ML
5 INJECTION INTRAVENOUS SEE ADMIN INSTRUCTIONS
Status: DISCONTINUED | OUTPATIENT
Start: 2024-09-24 | End: 2024-09-26

## 2024-09-24 RX ORDER — METOPROLOL TARTRATE 25 MG/1
100 TABLET, FILM COATED ORAL ONCE AS NEEDED
Status: DISCONTINUED | OUTPATIENT
Start: 2024-09-24 | End: 2024-09-24

## 2024-09-24 RX ORDER — METOPROLOL TARTRATE 25 MG/1
100 TABLET, FILM COATED ORAL ONCE AS NEEDED
Status: DISCONTINUED | OUTPATIENT
Start: 2024-09-24 | End: 2024-09-26

## 2024-09-24 RX ORDER — METOPROLOL TARTRATE 25 MG/1
50 TABLET, FILM COATED ORAL ONCE AS NEEDED
Status: DISCONTINUED | OUTPATIENT
Start: 2024-09-24 | End: 2024-09-24

## 2024-09-24 RX ADMIN — NITROGLYCERIN 0.4 MG: 0.4 TABLET SUBLINGUAL at 08:14:00

## 2024-09-24 NOTE — IMAGING NOTE
TO RAD HOLDING AT 0744      HX TAKEN: abnormal EKG, with stress test    PT CONSENTED AT 0749     BASELINE VITAL SIGNS: HR 52  /74 BMI 35.5    CTA ORDERED BY DR GUTIÉRREZ WAS PT GIVEN CTA PREMEDS YES 50MG PO METOPROLOL X2 DOSES    18 GAUGE IV STARTED AT 0758 POC TESTING COMPLETED GFR = 78   CREATINE = 1    TO CT TABLE @ 0813    CONNECT TO MONITOR  HR 52 /74      NITROGLYCERIN 0.4 MILLIGRAMS SUBLINGUAL GIVEN AT 0814      INJECTION STARTED AT 0819 HR 48 DURING SCAN PROCEDURE COMPLETE    POST SCAN HR 52 /65 AT 0821    PT TO HOLDING AREA  HR 50 /65 Q 15 MIN X2     AVS  PROVIDED      *** DISCHARGED HOME

## 2024-10-23 RX ORDER — METHYLPREDNISOLONE 4 MG/1
TABLET ORAL
Qty: 1 EACH | Refills: 0 | OUTPATIENT
Start: 2024-10-23

## 2024-10-30 NOTE — TELEPHONE ENCOUNTER
Please review: Original rx was written on 5/13/24 for a qty of #30 (15 day supply) with no refills.  Please advise if refill is appropriate.    No future appointments.  Last office visit: 5/13/2024    Requested Prescriptions   Pending Prescriptions Disp Refills    naproxen 500 MG Oral Tab 30 tablet 0     Sig: Take 1 tablet (500 mg total) by mouth 2 (two) times daily with meals.       Non-Narcotic Pain Medication Protocol Passed - 10/30/2024  3:35 PM        Passed - In person appointment or virtual visit in the past 6 mos or appointment in next 3 mos     Recent Outpatient Visits              3 months ago Abnormal PET scan of colon    [facility], [facility], Susu Kaiser PA-C    Office Visit    5 months ago Medicare annual wellness visit, subsequent    [facility], [address], Gucci Culp MD    Office Visit    8 months ago Essential hypertension    [facility], Angel Rubio MD    Office Visit    1 year ago Essential hypertension    [facility]    Office Visit    1 year ago Medicare annual wellness visit, subsequent    [facility], Gucci Culp MD    Office Visit                             Recent Outpatient Visits              3 months ago Abnormal PET scan of colon    [facility], [facility], Susu Kaiser PA-C    Office Visit    5 months ago Medicare annual wellness visit, subsequent    [facility], Gucci Culp MD    Office Visit    8 months ago [facility]    [facility]    1 year ago [facility]ential [facility]    Office Visit    1 year ago Medicare annual wellness  visit, subsequent    [facility], [address], Gucci Culp MD    Office Visit

## 2024-11-01 RX ORDER — NAPROXEN 500 MG/1
500 TABLET ORAL 2 TIMES DAILY WITH MEALS
Qty: 30 TABLET | Refills: 0 | Status: SHIPPED | OUTPATIENT
Start: 2024-11-01

## 2024-11-08 RX ORDER — CHLORTHALIDONE 25 MG/1
12.5 TABLET ORAL DAILY
Qty: 45 TABLET | Refills: 3 | Status: SHIPPED | OUTPATIENT
Start: 2024-11-08

## 2024-11-08 NOTE — TELEPHONE ENCOUNTER
Refill passed per [facility] protocol.  Requested Prescriptions   Pending Prescriptions Disp Refills    CHLORTHALIDONE 25 MG Oral Tab [Pharmacy Med Name: CHLORTHALIDONE 25MG TABLETS] 45 tablet 1     Sig: TAKE 1/2 TABLET(12.5 MG) BY MOUTH DAILY       Hypertension Medications Protocol Passed - 11/8/2024  4:24 PM        Passed - CMP or BMP in past 12 months        Passed - Last BP reading less than 140/90     BP Readings from Last 1 Encounters:   07/19/24 122/59               Passed - In person appointment or virtual visit in the past 12 mos or appointment in next 3 mos     Recent Outpatient Visits              3 months ago Abnormal PET scan of colon    [facility], Susu Kaiser PA-C    Office Visit    5 months ago Medicare annual wellness visit, subsequent    [facility], Gucci Culp MD    Office Visit    8 months ago Essential hypertension    [facility], Angel Rubio MD    Office Visit    1 year ago Essential hypertension    [facility], Angel Rubio MD    Office Visit    1 year ago Medicare annual wellness visit, subsequent    [facility], Gucci Culp MD    Office Visit                      Passed - EGFRCR or GFRNAA > 50     GFR Evaluation  EGFRCR: 78 , resulted on 9/24/2024               Recent Outpatient Visits              3 months ago Abnormal PET scan of colon    [facility], Susu Kaiser PA-C    Office Visit    5 months ago Medicare annual wellness visit, subsequent    [facility]    [facility]    8 months ago Essential [facility]    Office Visit    1 year ago Essential hypertension    [place]  [facility], Angel Rubio MD    Office Visit    1 year ago Medicare annual wellness visit, subsequent    [facility] [address], Gucci Culp MD    Office Visit

## 2024-11-14 ENCOUNTER — LAB ENCOUNTER (OUTPATIENT)
Dept: LAB | Age: 79
End: 2024-11-14
Attending: INTERNAL MEDICINE
Payer: MEDICARE

## 2024-11-14 DIAGNOSIS — D69.6 THROMBOCYTOPENIA (HCC): ICD-10-CM

## 2024-11-14 DIAGNOSIS — E78.2 MIXED HYPERLIPIDEMIA: Primary | ICD-10-CM

## 2024-11-14 DIAGNOSIS — Z12.5 PROSTATE CANCER SCREENING: ICD-10-CM

## 2024-11-14 LAB
CHOLEST SERPL-MCNC: 110 MG/DL (ref ?–200)
COMPLEXED PSA SERPL-MCNC: 2.7 NG/ML (ref ?–4)
FASTING PATIENT LIPID ANSWER: YES
FOLATE SERPL-MCNC: 14 NG/ML (ref 5.4–?)
HDLC SERPL-MCNC: 37 MG/DL (ref 40–59)
LDLC SERPL CALC-MCNC: 61 MG/DL (ref ?–100)
NONHDLC SERPL-MCNC: 73 MG/DL (ref ?–130)
PLATELET # BLD AUTO: 151 10(3)UL (ref 150–450)
TRIGL SERPL-MCNC: 53 MG/DL (ref 30–149)
VIT B12 SERPL-MCNC: 296 PG/ML (ref 211–911)
VLDLC SERPL CALC-MCNC: 8 MG/DL (ref 0–30)

## 2024-11-14 PROCEDURE — 80061 LIPID PANEL: CPT

## 2024-11-14 PROCEDURE — 36415 COLL VENOUS BLD VENIPUNCTURE: CPT | Performed by: INTERNAL MEDICINE

## 2024-11-14 PROCEDURE — 85049 AUTOMATED PLATELET COUNT: CPT

## 2024-11-14 PROCEDURE — 82607 VITAMIN B-12: CPT | Performed by: INTERNAL MEDICINE

## 2024-11-14 PROCEDURE — 82746 ASSAY OF FOLIC ACID SERUM: CPT | Performed by: INTERNAL MEDICINE

## 2024-12-03 ENCOUNTER — HOSPITAL ENCOUNTER (OUTPATIENT)
Age: 79
Discharge: HOME | End: 2024-12-03
Payer: MEDICARE

## 2024-12-03 ENCOUNTER — APPOINTMENT (OUTPATIENT)
Dept: GENERAL RADIOLOGY | Age: 79
End: 2024-12-03
Attending: NURSE PRACTITIONER
Payer: MEDICARE

## 2024-12-03 VITALS
DIASTOLIC BLOOD PRESSURE: 77 MMHG | OXYGEN SATURATION: 96 % | RESPIRATION RATE: 18 BRPM | TEMPERATURE: 98 F | SYSTOLIC BLOOD PRESSURE: 148 MMHG | HEART RATE: 75 BPM

## 2024-12-03 DIAGNOSIS — J40 BRONCHITIS: ICD-10-CM

## 2024-12-03 DIAGNOSIS — Z20.822 COVID-19 RULED OUT BY LABORATORY TESTING: Primary | ICD-10-CM

## 2024-12-03 DIAGNOSIS — R05.1 ACUTE COUGH: ICD-10-CM

## 2024-12-03 LAB — SARS-COV-2 RNA RESP QL NAA+PROBE: NOT DETECTED

## 2024-12-03 PROCEDURE — 71046 X-RAY EXAM CHEST 2 VIEWS: CPT | Performed by: NURSE PRACTITIONER

## 2024-12-03 PROCEDURE — 99213 OFFICE O/P EST LOW 20 MIN: CPT | Performed by: NURSE PRACTITIONER

## 2024-12-03 PROCEDURE — U0002 COVID-19 LAB TEST NON-CDC: HCPCS | Performed by: NURSE PRACTITIONER

## 2024-12-03 RX ORDER — BENZONATATE 100 MG/1
100 CAPSULE ORAL 3 TIMES DAILY PRN
Qty: 10 CAPSULE | Refills: 0 | Status: SHIPPED | OUTPATIENT
Start: 2024-12-03

## 2024-12-03 RX ORDER — CODEINE PHOSPHATE AND GUAIFENESIN 10; 100 MG/5ML; MG/5ML
5 SOLUTION ORAL EVERY 6 HOURS PRN
Qty: 60 ML | Refills: 0 | Status: SHIPPED | OUTPATIENT
Start: 2024-12-03

## 2024-12-03 RX ORDER — PREDNISONE 20 MG/1
40 TABLET ORAL DAILY
Qty: 10 TABLET | Refills: 0 | Status: SHIPPED | OUTPATIENT
Start: 2024-12-03 | End: 2024-12-08

## 2024-12-03 RX ORDER — ALBUTEROL SULFATE 90 UG/1
2 INHALANT RESPIRATORY (INHALATION) EVERY 4 HOURS PRN
Qty: 1 EACH | Refills: 0 | Status: SHIPPED | OUTPATIENT
Start: 2024-12-03 | End: 2025-01-02

## 2024-12-03 RX ORDER — INHALER, ASSIST DEVICES
SPACER (EA) MISCELLANEOUS
Qty: 1 EACH | Refills: 0 | Status: SHIPPED | OUTPATIENT
Start: 2024-12-03

## 2024-12-03 NOTE — ED PROVIDER NOTES
Patient Seen in: [facility]      History     Chief Complaint   Patient presents with    Cough    Runny Nose     Stated Complaint: Cough; Runny Nose  Subjective:   79-year-old male with asthma, BPH, hypertension presents from home.  Patient has had cold symptoms for about 4 days.  Complaining of cough, runny nose, sneezing.  Symptoms were the worst in severity on day 2 but notes they have improved today.  No shortness of breath.  No fever.  No COVID testing done at home.  States last year he had pneumonitis.  He has been taking Afrin and leftover guaifenesin with codeine at home.  He is a non-smoker.  Denies recent asthma symptoms        Objective:   No pertinent past medical history.        HISTORY:  Past Medical History:    Asthma (HCC)    Asymptomatic gallstones    Atherosclerosis of coronary artery    BPH (benign prostatic hyperplasia)    BPH with elevated PSA    Cataract    Colon polyp    Coronary atherosclerosis    Essential hypertension    Gastrointestinal bleeding    Right red/with straining    Hay fever    Heart disease    Ca score 233    Hemorrhoids    Periodic    High blood pressure    High cholesterol    Hx of motion sickness    Hyperlipidemia    Obesity (BMI 30-39.9)    PONV (postoperative nausea and vomiting)    Pulmonary nodule    Visual impairment    glasses      Past Surgical History:   Procedure Laterality Date    Appendectomy      Cataract      Cataract extraction w/ intraocular lens implant Right 08/25/2021    TASIA CE LENSX/ORA TORIC MAC OD SN6AT5 +13.5    Cataract extraction w/ intraocular lens implant Left 09/08/2021    TASIA CE LENSX/ORA TRAD MAC OS    Colonoscopy      2016 rectal polyp    Colonoscopy      2021 polyp dr larry repeat in 5 yrs    Colonoscopy      Colonoscopy N/A 7/19/2024    Procedure: COLONOSCOPY;  Surgeon: Donald Boateng MD;  [facility]    Hernia surgery Bilateral     inguinal hernia      Family History   Problem Relation Age of Onset    Cancer  Father         Brain cancer age 76    Hypertension Father     Dementia Mother         Alzheimers    Other (Other) Sister         RA    Hypertension Brother     Cancer Brother         Inoperable brain tumer      Social History     Socioeconomic History    Marital status:    Tobacco Use    Smoking status: Former     Current packs/day: 0.50     Average packs/day: 0.5 packs/day for 10.0 years (5.0 ttl pk-yrs)     Types: Cigarettes     Passive exposure: Past    Smokeless tobacco: Never   Vaping Use    Vaping status: Never Used   Substance and Sexual Activity    Alcohol use: Yes     Alcohol/week: 1.0 - 3.0 standard drink of alcohol     Types: 1 - 3 Glasses of wine per week     Comment: 2 x monthly/socially    Drug use: Never   Other Topics Concern    Caffeine Concern Yes     Comment: Coffee 2 cups daily     Social Drivers of Health      Received from [employer]    [facility]          No pertinent past surgical history.            No pertinent social history.          Review of Systems    Positive for stated complaint: Cough and Runny Nose    Other systems are as noted in HPI.  Constitutional and vital signs reviewed.      All other systems reviewed and negative except as noted above.    Physical Exam     ED Triage Vitals [12/03/24 1113]   /77   Pulse 75   Resp 18   Temp 98 °F (36.7 °C)   Temp src Temporal   SpO2 96 %   O2 Device None (Room air)     Current:/77   Pulse 75   Temp 98 °F (36.7 °C) (Temporal)   Resp 18   SpO2 96%     Physical Exam  Vitals and nursing note reviewed.   Constitutional:       General: He is not in acute distress.     Appearance: Normal appearance. He is not ill-appearing or toxic-appearing.   HENT:      Head: Normocephalic and atraumatic.      Right Ear: Tympanic membrane, ear canal and external ear normal.      Left Ear: Tympanic membrane, ear canal and external ear normal.      Nose: Nose normal.      Right Turbinates: Not enlarged.      Left Turbinates:  Not enlarged.      Right Sinus: No maxillary sinus tenderness or frontal sinus tenderness.      Left Sinus: No maxillary sinus tenderness or frontal sinus tenderness.      Mouth/Throat:      Mouth: Mucous membranes are moist.      Pharynx: Oropharynx is clear. No pharyngeal swelling or posterior oropharyngeal erythema.   Eyes:      Pupils: Pupils are equal, round, and reactive to light.   Cardiovascular:      Rate and Rhythm: Normal rate and regular rhythm.      Pulses: Normal pulses.   Pulmonary:      Effort: Pulmonary effort is normal. No respiratory distress.      Breath sounds: Normal breath sounds.      Comments: Intermittent dry cough. Lungs clear.  No adventitious lung sounds.  No distress.  No hypoxia.  Pulse ox 96% ra. Which is normal    Abdominal:      General: Abdomen is flat.      Palpations: Abdomen is soft.   Musculoskeletal:         General: No signs of injury. Normal range of motion.      Cervical back: Normal range of motion and neck supple.   Skin:     General: Skin is warm and dry.      Capillary Refill: Capillary refill takes less than 2 seconds.   Neurological:      General: No focal deficit present.      Mental Status: He is alert and oriented to person, place, and time.      GCS: GCS eye subscore is 4. GCS verbal subscore is 5. GCS motor subscore is 6.   Psychiatric:         Mood and Affect: Mood normal.         Behavior: Behavior normal.         Thought Content: Thought content normal.         Judgment: Judgment normal.         ED Course   Radiology:  XR CHEST PA + LAT CHEST (CPT=71046)    Result Date: 12/3/2024  CONCLUSION:  No acute cardiopulmonary process.    Dictated by (CST): Mason Strange MD on 12/03/2024 at 11:59 AM     Finalized by (CST): Mason Strange MD on 12/03/2024 at 12:00 PM         Labs Reviewed   RAPID SARS-COV-2 BY PCR - Normal     Recent Results (from the past 24 hours)   Rapid SARS-CoV-2 by PCR    Collection Time: 12/03/24 11:19 AM    Specimen: Nares; Other    Result Value Ref Range    Rapid SARS-CoV-2 by PCR Not Detected Not Detected     MDM     Medical Decision Making  Differential diagnoses reflecting the complexity of care include: COVID, pneumonia, bronchitis, viral URI, otitis media  Notes some discomfort in his left ear.  Bilateral TMs are clear without otitis media  COVID is negative  Chest x-ray is negative for pneumonia  Persistent cough noted while patient was waiting for x-ray results.  Consistent with bronchospasm.  No wheezing auscultated on exam.  Patient is in no distress.  No hypoxia.  Pulse ox 96% room air which is normal    Plan of Care: Albuterol MDI, Tessalon, prednisone.  Patient was previously using guaifenesin with codeine and is requesting a refill.  Sedatives of effects discussed with the patient.  He was encouraged to drink more fluids, use cough drops, follow-up with primary doctor    Results and plan of care discussed with the patient/family. They are in agreement with discharge. They understand to follow up with their primary doctor or the referral physician for further evaluation, especially if no improvement.  Also discussed the limitations of immediate care, patient is aware that if symptoms are worse they should go to the emergency room. Verbal and written discharge instructions were given.     My independent interpretation of studies of: No pneumonia on chest x-ray   Diagnostic tests and medications considered but not ordered were: no indication for antibiotics today  Shared decision making was done by: Patient has used inhalers in the past and is agreeable today  Comorbidities that add complexity to management include: Hypertension, high cholesterol, asthma  External chart review was done and was noted: Seen here 1/3/2024 with URI symptoms.  Abnormal chest x-ray, treated with Augmentin and doxycycline  History obtained by an independent source was from: N/A  Discussions and management was done with: N/A  Social determinants of health  that affect care: Age            Problems Addressed:  Acute cough: acute illness or injury  Bronchitis: acute illness or injury  COVID-19 ruled out by laboratory testing: acute illness or injury    Amount and/or Complexity of Data Reviewed  Labs: ordered. Decision-making details documented in ED Course.  Radiology: ordered and independent interpretation performed. Decision-making details documented in ED Course.    Risk  OTC drugs.  Prescription drug management.        Disposition and Plan     Clinical Impression:  1. COVID-19 ruled out by laboratory testing    2. Acute cough    3. Bronchitis         Disposition:  Discharge  12/3/2024 12:04 pm    Follow-up:  Gucci Castro MD  [address] [place] [address]  [phone]                Medications Prescribed:  Discharge Medication List as of 12/3/2024 12:10 PM        START taking these medications    Details   albuterol 108 (90 Base) MCG/ACT Inhalation Aero Soln Inhale 2 puffs into the lungs every 4 (four) hours as needed for Wheezing., Normal, Disp-1 each, R-0      predniSONE 20 MG Oral Tab Take 2 tablets (40 mg total) by mouth daily for 5 days., Normal, Disp-10 tablet, R-0      benzonatate 100 MG Oral Cap Take 1 capsule (100 mg total) by mouth 3 (three) times daily as needed for cough., Normal, Disp-10 capsule, R-0      Spacer/Aero-Holding Chambers (BREATHERITE JOSE SPACER ADULT) Does not apply Misc Use with albuterol, Normal, Disp-1 each, R-0      !! guaiFENesin-codeine 100-10 MG/5ML Oral Solution Take 5 mL by mouth every 6 (six) hours as needed., Normal, Disp-60 mL, R-0       !! - Potential duplicate medications found. Please discuss with provider.

## 2024-12-03 NOTE — DISCHARGE INSTRUCTIONS
COVID test is negative.  No pneumonia seen on the chest x-ray.  Use the inhaler as needed for cough.  Take the steroid daily.  Take Tessalon as needed for cough.  Use over the counter flonase daily. Drink plenty fluids.  Schedule follow-up with your primary doctor

## 2024-12-05 RX ORDER — CODEINE PHOSPHATE AND GUAIFENESIN 10; 100 MG/5ML; MG/5ML
5 SOLUTION ORAL EVERY 6 HOURS PRN
Qty: 240 ML | Refills: 0 | OUTPATIENT
Start: 2024-12-05

## 2024-12-05 NOTE — TELEPHONE ENCOUNTER
Guaifenesin-codeine: Sent triage message to patient. Patient did go to urgent care on 12/3/2024 and got 3 day supply.

## 2024-12-27 DIAGNOSIS — J40 BRONCHITIS: ICD-10-CM

## 2024-12-27 RX ORDER — CODEINE PHOSPHATE AND GUAIFENESIN 10; 100 MG/5ML; MG/5ML
5 SOLUTION ORAL EVERY 6 HOURS PRN
Qty: 60 ML | Refills: 0 | Status: SHIPPED | OUTPATIENT
Start: 2024-12-27

## 2024-12-27 NOTE — TELEPHONE ENCOUNTER
Name and  verified, Dr. Gloria coronel advise, patient requesting refill for guaiFENesin-codeine 100-10 MG/5ML Oral Solution that he was prescribed in the urgent care on . Prescription pended for your approval if appropriate.

## 2025-01-29 ENCOUNTER — OFFICE VISIT (OUTPATIENT)
Dept: INTERNAL MEDICINE CLINIC | Facility: CLINIC | Age: 80
End: 2025-01-29
Payer: MEDICARE

## 2025-01-29 VITALS
HEART RATE: 62 BPM | WEIGHT: 211.25 LBS | SYSTOLIC BLOOD PRESSURE: 146 MMHG | DIASTOLIC BLOOD PRESSURE: 80 MMHG | BODY MASS INDEX: 36 KG/M2

## 2025-01-29 DIAGNOSIS — N40.1 BENIGN PROSTATIC HYPERPLASIA WITH URINARY FREQUENCY: ICD-10-CM

## 2025-01-29 DIAGNOSIS — I10 ESSENTIAL HYPERTENSION: ICD-10-CM

## 2025-01-29 DIAGNOSIS — R05.2 SUBACUTE COUGH: Primary | ICD-10-CM

## 2025-01-29 DIAGNOSIS — R35.0 BENIGN PROSTATIC HYPERPLASIA WITH URINARY FREQUENCY: ICD-10-CM

## 2025-01-29 PROCEDURE — 99213 OFFICE O/P EST LOW 20 MIN: CPT | Performed by: INTERNAL MEDICINE

## 2025-01-29 RX ORDER — ASPIRIN 81 MG/1
81 TABLET ORAL DAILY
COMMUNITY
Start: 2024-07-08

## 2025-01-29 RX ORDER — CODEINE PHOSPHATE AND GUAIFENESIN 10; 100 MG/5ML; MG/5ML
5 SOLUTION ORAL EVERY 6 HOURS PRN
Qty: 240 ML | Refills: 0 | Status: SHIPPED | OUTPATIENT
Start: 2025-01-29

## 2025-01-29 RX ORDER — TADALAFIL 5 MG/1
5 TABLET ORAL
Qty: 90 TABLET | Refills: 3 | Status: SHIPPED | OUTPATIENT
Start: 2025-01-29

## 2025-01-29 RX ORDER — PREDNISONE 10 MG/1
TABLET ORAL
Qty: 20 TABLET | Refills: 0 | Status: SHIPPED | OUTPATIENT
Start: 2025-01-29

## 2025-01-29 RX ORDER — NAPROXEN 500 MG/1
500 TABLET ORAL 2 TIMES DAILY WITH MEALS
Qty: 60 TABLET | Refills: 1 | Status: SHIPPED | OUTPATIENT
Start: 2025-01-29

## 2025-01-29 NOTE — PROGRESS NOTES
Subjective:     Patient ID: Marino Lozano is a 79 year old male.    Cough  This is a recurrent problem. The current episode started more than 1 month ago (10 weeks). The problem has been gradually improving (pt states last 10 days coughing had been ip;roving). The problem occurs every few hours. The cough is Productive of sputum. Associated symptoms include wheezing. Pertinent negatives include no chest pain, ear pain, fever, hemoptysis, nasal congestion, postnasal drip or shortness of breath. Nothing aggravates the symptoms. He has tried prescription cough suppressant and oral steroids for the symptoms. The treatment provided mild relief. His past medical history is significant for environmental allergies. There is no history of asthma or COPD.       History/Other:   Review of Systems   Constitutional: Negative.  Negative for fever.   HENT:  Negative for ear pain and postnasal drip.    Respiratory:  Positive for cough and wheezing. Negative for hemoptysis and shortness of breath.    Cardiovascular: Negative.  Negative for chest pain, palpitations and leg swelling.   Gastrointestinal: Negative.    Genitourinary: Negative.    Allergic/Immunologic: Positive for environmental allergies.     Current Outpatient Medications   Medication Sig Dispense Refill    aspirin 81 MG Oral Tab EC Take 1 tablet (81 mg total) by mouth daily.      Coenzyme Q10 200 MG Oral Cap Take 200 mg by mouth daily.      benzonatate 100 MG Oral Cap Take 1 capsule (100 mg total) by mouth 3 (three) times daily as needed for cough. 10 capsule 0    chlorthalidone 25 MG Oral Tab Take 0.5 tablets (12.5 mg total) by mouth daily. 45 tablet 3    naproxen 500 MG Oral Tab Take 1 tablet (500 mg total) by mouth 2 (two) times daily with meals. 30 tablet 0    Clobetasol Propionate 0.05 % External Gel Apply 1 Application  topically 2 (two) times daily. Apply to affected area as needed. 60 g 3    amLODIPine 10 MG Oral Tab Take 1 tablet (10 mg total) by mouth  daily. (Patient taking differently: Take 1 tablet (10 mg total) by mouth every evening.) 90 tablet 3    losartan 50 MG Oral Tab Take 2 tablets (100 mg total) by mouth daily. (Patient taking differently: Take 1 tablet (50 mg total) by mouth in the morning and 1 tablet (50 mg total) before bedtime.) 180 tablet 3    rosuvastatin 10 MG Oral Tab Take 1 tablet (10 mg total) by mouth nightly.      methylPREDNISolone (MEDROL) 4 MG Oral Tablet Therapy Pack As directed. 1 each 0    guaiFENesin-codeine 100-10 MG/5ML Oral Solution Take 5 mL by mouth every 6 (six) hours as needed for cough. 240 mL 0    fluticasone propionate 50 MCG/ACT Nasal Suspension 2 sprays by Each Nare route daily. 3 each 1    Vardenafil HCl 20 MG Oral Tab Take 1 tablet (20 mg total) by mouth daily as needed. 30 MINS BEFORE SEX 30 tablet 2    SYMBICORT 160-4.5 MCG/ACT Inhalation Aerosol INHALE 2 PUFFS INTO THE LUNGS 2 (TWO) TIMES DAILY. 10 Inhaler 0    Spacer/Aero-Holding Chambers (BREATHERITE JOSE SPACER ADULT) Does not apply Misc Use with albuterol 1 each 0    hydrALAZINE 10 MG Oral Tab Take 1 tablet (10 mg total) by mouth 3 (three) times daily. (Patient not taking: Reported on 1/29/2025) 270 tablet 3     Allergies:Allergies[1]    Past Medical History:    Asthma (HCC)    Asymptomatic gallstones    Atherosclerosis of coronary artery    BPH (benign prostatic hyperplasia)    BPH with elevated PSA    Cataract    Colon polyp    Coronary atherosclerosis    Essential hypertension    Gastrointestinal bleeding    Right red/with straining    Hay fever    Heart disease    Ca score 233    Hemorrhoids    Periodic    High blood pressure    High cholesterol    Hx of motion sickness    Hyperlipidemia    Obesity (BMI 30-39.9)    PONV (postoperative nausea and vomiting)    Pulmonary nodule    Visual impairment    glasses      Past Surgical History:   Procedure Laterality Date    Appendectomy      Cataract      Cataract extraction w/ intraocular lens implant Right  08/25/2021    TASIA CE LENSX/ORA TORIC MAC OD SN6AT5 +13.5    Cataract extraction w/ intraocular lens implant Left 09/08/2021    TASIA CE LENSX/ORA TRAD MAC OS    Colonoscopy      2016 rectal polyp    Colonoscopy      2021 polyp dr larry repeat in 5 yrs    Colonoscopy      Colonoscopy N/A 7/19/2024    Procedure: COLONOSCOPY;  Surgeon: Donald Boateng MD;  Location: [facility] MAIN OR    Hernia surgery Bilateral     inguinal hernia      Family History   Problem Relation Age of Onset    Cancer Father         Brain cancer age 76    Hypertension Father     Dementia Mother         Alzheimers    Other (Other) Sister         RA    Hypertension Brother     Cancer Brother         Inoperable brain tumer      Social History:   Social History     Socioeconomic History    Marital status:    Tobacco Use    Smoking status: Former     Current packs/day: 0.50     Average packs/day: 0.5 packs/day for 10.0 years (5.0 ttl pk-yrs)     Types: Cigarettes     Passive exposure: Past    Smokeless tobacco: Never   Vaping Use    Vaping status: Never Used   Substance and Sexual Activity    Alcohol use: Yes     Alcohol/week: 1.0 - 3.0 standard drink of alcohol     Types: 1 - 3 Glasses of wine per week     Comment: 2 x monthly/socially    Drug use: Never   Other Topics Concern    Caffeine Concern Yes     Comment: Coffee 2 cups daily     Social Drivers of Health      [facility] [employer]    [facility]        Objective:   Physical Exam  Constitutional:       General: He is not in acute distress.     Appearance: He is not ill-appearing, toxic-appearing or diaphoretic.   HENT:      Right Ear: Tympanic membrane, ear canal and external ear normal.      Left Ear: Tympanic membrane, ear canal and external ear normal.   Cardiovascular:      Rate and Rhythm: Normal rate and regular rhythm.      Heart sounds: Normal heart sounds. No murmur heard.     No gallop.   Pulmonary:      Effort: Pulmonary effort is normal. No respiratory  distress.      Breath sounds: Normal breath sounds. No wheezing or rales.   Abdominal:      General: Bowel sounds are normal. There is no distension.      Palpations: Abdomen is soft. There is no mass.      Tenderness: There is no abdominal tenderness. There is no guarding.   Musculoskeletal:      Cervical back: Normal range of motion and neck supple. No rigidity or tenderness.      Right lower leg: No edema.      Left lower leg: No edema.   Lymphadenopathy:      Cervical: No cervical adenopathy.   Neurological:      Mental Status: He is alert.         Assessment & Plan:   (R05.2) Subacute cough  (primary encounter diagnosis)  Plan: guaiFENesin-codeine 100-10 MG/5ML Oral Solution        Cxr done 4 weeks ago negative for pneuonia; suspect his coughing is postinfectoius cough and now slowly improving; I gave him a short course of prendisone and he will continue with his albuterol inhaler. Call back if coughing presist/worsens.     (N40.1,  R35.0) Benign prostatic hyperplasia with urinary frequency  Plan: tadalafil (CIALIS) 5 MG Oral Tab        Pt had asked about trying cialis 5mg daily for his BPH which I told pt may try .     (I10) Essential hypertension  Plan: bp elevated today but he does monitor his bp at home and had been in good range in the 130/70 per pt; continue to monitor bp at home.      No orders of the defined types were placed in this encounter.      Meds This Visit:  Requested Prescriptions      No prescriptions requested or ordered in this encounter       Imaging & Referrals:  None            [1]   Allergies  Allergen Reactions    Seasonal

## 2025-02-05 ENCOUNTER — TELEPHONE (OUTPATIENT)
Dept: INTERNAL MEDICINE CLINIC | Facility: CLINIC | Age: 80
End: 2025-02-05

## 2025-02-05 DIAGNOSIS — N40.1 BENIGN PROSTATIC HYPERPLASIA WITH URINARY RETENTION: ICD-10-CM

## 2025-02-05 DIAGNOSIS — R33.8 BENIGN PROSTATIC HYPERPLASIA WITH URINARY RETENTION: ICD-10-CM

## 2025-02-05 DIAGNOSIS — N52.9 ERECTILE DYSFUNCTION, UNSPECIFIED ERECTILE DYSFUNCTION TYPE: Primary | ICD-10-CM

## 2025-02-05 NOTE — TELEPHONE ENCOUNTER
Prior authorization for Tadalafil completed through [facility], key [id number]

## 2025-02-05 NOTE — TELEPHONE ENCOUNTER
KEY: [id number]      tadalafil (CIALIS) 5 MG Oral Tab, Take 1 tablet (5 mg total) by mouth daily as needed for Erectile Dysfunction., Disp: 90 tablet, Rfl: 3

## 2025-05-23 RX ORDER — PREDNISONE 10 MG/1
TABLET ORAL
Qty: 20 TABLET | Refills: 0 | OUTPATIENT
Start: 2025-05-23

## 2025-06-11 RX ORDER — PREDNISONE 10 MG/1
TABLET ORAL
Qty: 20 TABLET | Refills: 0 | Status: SHIPPED | OUTPATIENT
Start: 2025-06-11

## 2025-06-11 NOTE — TELEPHONE ENCOUNTER
Please review. Protocol Failed; No Protocol    Please see patients MyChart message  and advise if refill is appropriate.      predniSONE 10 MG Oral Tab [Gucci Castro]      Patient Comment: I am leaving [place] and would like to have prednisone with me as i am still having occasional flare up’s with my knee.  Thank you

## 2025-06-17 ENCOUNTER — MED REC SCAN ONLY (OUTPATIENT)
Dept: FAMILY MEDICINE CLINIC | Facility: CLINIC | Age: 80
End: 2025-06-17

## 2025-07-29 ENCOUNTER — OFFICE VISIT (OUTPATIENT)
Dept: INTERNAL MEDICINE CLINIC | Facility: CLINIC | Age: 80
End: 2025-07-29
Payer: MEDICARE

## 2025-07-29 VITALS
WEIGHT: 213.56 LBS | HEIGHT: 64 IN | DIASTOLIC BLOOD PRESSURE: 82 MMHG | BODY MASS INDEX: 36.46 KG/M2 | HEART RATE: 61 BPM | SYSTOLIC BLOOD PRESSURE: 134 MMHG

## 2025-07-29 DIAGNOSIS — E66.9 OBESITY (BMI 30-39.9): ICD-10-CM

## 2025-07-29 DIAGNOSIS — M17.11 PRIMARY OSTEOARTHRITIS OF ONE KNEE, RIGHT: ICD-10-CM

## 2025-07-29 DIAGNOSIS — R91.1 SOLID NODULE OF LUNG GREATER THAN 8 MM IN DIAMETER: ICD-10-CM

## 2025-07-29 DIAGNOSIS — R35.0 BENIGN PROSTATIC HYPERPLASIA WITH URINARY FREQUENCY: ICD-10-CM

## 2025-07-29 DIAGNOSIS — Z12.11 COLON CANCER SCREENING: ICD-10-CM

## 2025-07-29 DIAGNOSIS — E78.2 MIXED HYPERLIPIDEMIA: ICD-10-CM

## 2025-07-29 DIAGNOSIS — Z00.00 MEDICARE ANNUAL WELLNESS VISIT, SUBSEQUENT: Primary | ICD-10-CM

## 2025-07-29 DIAGNOSIS — N40.1 BENIGN PROSTATIC HYPERPLASIA WITH URINARY FREQUENCY: ICD-10-CM

## 2025-07-29 DIAGNOSIS — I25.10 CORONARY ARTERY DISEASE INVOLVING NATIVE CORONARY ARTERY OF NATIVE HEART WITHOUT ANGINA PECTORIS: ICD-10-CM

## 2025-07-29 DIAGNOSIS — I10 ESSENTIAL HYPERTENSION: ICD-10-CM

## 2025-07-29 PROCEDURE — G0439 PPPS, SUBSEQ VISIT: HCPCS | Performed by: INTERNAL MEDICINE

## 2025-07-29 RX ORDER — TADALAFIL 20 MG/1
20 TABLET ORAL AS NEEDED
COMMUNITY
Start: 2025-07-22

## 2025-08-08 ENCOUNTER — LAB ENCOUNTER (OUTPATIENT)
Dept: LAB | Age: 80
End: 2025-08-08
Attending: INTERNAL MEDICINE

## 2025-08-08 DIAGNOSIS — Z00.00 MEDICARE ANNUAL WELLNESS VISIT, SUBSEQUENT: ICD-10-CM

## 2025-08-08 LAB
ALBUMIN SERPL-MCNC: 4.3 G/DL (ref 3.2–4.8)
ALBUMIN/GLOB SERPL: 1.8 (ref 1–2)
ALP LIVER SERPL-CCNC: 100 U/L (ref 45–117)
ALT SERPL-CCNC: 14 U/L (ref 10–49)
ANION GAP SERPL CALC-SCNC: 5 MMOL/L (ref 0–18)
AST SERPL-CCNC: 15 U/L (ref ?–34)
BASOPHILS # BLD AUTO: 0.06 X10(3) UL (ref 0–0.2)
BASOPHILS NFR BLD AUTO: 0.5 %
BILIRUB SERPL-MCNC: 0.4 MG/DL (ref 0.2–1.1)
BUN BLD-MCNC: 17 MG/DL (ref 9–23)
BUN/CREAT SERPL: 17.2 (ref 10–20)
CALCIUM BLD-MCNC: 9.1 MG/DL (ref 8.7–10.4)
CHLORIDE SERPL-SCNC: 103 MMOL/L (ref 98–112)
CHOLEST SERPL-MCNC: 100 MG/DL (ref ?–200)
CO2 SERPL-SCNC: 31 MMOL/L (ref 21–32)
CREAT BLD-MCNC: 0.99 MG/DL (ref 0.7–1.3)
DEPRECATED RDW RBC AUTO: 42.4 FL (ref 35.1–46.3)
EGFRCR SERPLBLD CKD-EPI 2021: 78 ML/MIN/1.73M2 (ref 60–?)
EOSINOPHIL # BLD AUTO: 0.23 X10(3) UL (ref 0–0.7)
EOSINOPHIL NFR BLD AUTO: 1.8 %
ERYTHROCYTE [DISTWIDTH] IN BLOOD BY AUTOMATED COUNT: 13.1 % (ref 11–15)
EST. AVERAGE GLUCOSE BLD GHB EST-MCNC: 120 MG/DL (ref 68–126)
FASTING PATIENT LIPID ANSWER: YES
FASTING STATUS PATIENT QL REPORTED: YES
GLOBULIN PLAS-MCNC: 2.4 G/DL (ref 2–3.5)
GLUCOSE BLD-MCNC: 95 MG/DL (ref 70–99)
HBA1C MFR BLD: 5.8 % (ref ?–5.7)
HCT VFR BLD AUTO: 41.3 % (ref 39–53)
HDLC SERPL-MCNC: 42 MG/DL (ref 40–59)
HGB BLD-MCNC: 13.6 G/DL (ref 13–17.5)
IMM GRANULOCYTES # BLD AUTO: 0.08 X10(3) UL (ref 0–1)
IMM GRANULOCYTES NFR BLD: 0.6 %
LDLC SERPL CALC-MCNC: 45 MG/DL (ref ?–100)
LYMPHOCYTES # BLD AUTO: 2.16 X10(3) UL (ref 1–4)
LYMPHOCYTES NFR BLD AUTO: 17.4 %
MCH RBC QN AUTO: 29.2 PG (ref 26–34)
MCHC RBC AUTO-ENTMCNC: 32.9 G/DL (ref 31–37)
MCV RBC AUTO: 88.6 FL (ref 80–100)
MONOCYTES # BLD AUTO: 0.91 X10(3) UL (ref 0.1–1)
MONOCYTES NFR BLD AUTO: 7.3 %
NEUTROPHILS # BLD AUTO: 9 X10 (3) UL (ref 1.5–7.7)
NEUTROPHILS # BLD AUTO: 9 X10(3) UL (ref 1.5–7.7)
NEUTROPHILS NFR BLD AUTO: 72.4 %
NONHDLC SERPL-MCNC: 58 MG/DL (ref ?–130)
OSMOLALITY SERPL CALC.SUM OF ELEC: 289 MOSM/KG (ref 275–295)
PLATELET # BLD AUTO: 176 10(3)UL (ref 150–450)
POTASSIUM SERPL-SCNC: 4.1 MMOL/L (ref 3.5–5.1)
PROT SERPL-MCNC: 6.7 G/DL (ref 5.7–8.2)
RBC # BLD AUTO: 4.66 X10(6)UL (ref 3.8–5.8)
SODIUM SERPL-SCNC: 139 MMOL/L (ref 136–145)
TRIGL SERPL-MCNC: 56 MG/DL (ref 30–149)
VLDLC SERPL CALC-MCNC: 8 MG/DL (ref 0–30)
WBC # BLD AUTO: 12.4 X10(3) UL (ref 4–11)

## 2025-08-08 PROCEDURE — 80061 LIPID PANEL: CPT

## 2025-08-08 PROCEDURE — 36415 COLL VENOUS BLD VENIPUNCTURE: CPT

## 2025-08-08 PROCEDURE — 85025 COMPLETE CBC W/AUTO DIFF WBC: CPT

## 2025-08-08 PROCEDURE — 83036 HEMOGLOBIN GLYCOSYLATED A1C: CPT

## 2025-08-08 PROCEDURE — 80053 COMPREHEN METABOLIC PANEL: CPT

## 2025-08-15 RX ORDER — AMLODIPINE BESYLATE 10 MG/1
10 TABLET ORAL DAILY
Qty: 90 TABLET | Refills: 3 | Status: SHIPPED | OUTPATIENT
Start: 2025-08-15

## 2025-08-26 ENCOUNTER — LAB ENCOUNTER (OUTPATIENT)
Dept: LAB | Age: 80
End: 2025-08-26
Attending: INTERNAL MEDICINE

## 2025-08-26 DIAGNOSIS — D72.829 LEUKOCYTOSIS, UNSPECIFIED TYPE: ICD-10-CM

## 2025-08-26 LAB
BASOPHILS # BLD AUTO: 0.05 X10(3) UL (ref 0–0.2)
BASOPHILS NFR BLD AUTO: 0.6 %
DEPRECATED RDW RBC AUTO: 42.5 FL (ref 35.1–46.3)
EOSINOPHIL # BLD AUTO: 0.51 X10(3) UL (ref 0–0.7)
EOSINOPHIL NFR BLD AUTO: 6.5 %
ERYTHROCYTE [DISTWIDTH] IN BLOOD BY AUTOMATED COUNT: 12.9 % (ref 11–15)
HCT VFR BLD AUTO: 38.4 % (ref 39–53)
HGB BLD-MCNC: 12.7 G/DL (ref 13–17.5)
IMM GRANULOCYTES # BLD AUTO: 0.04 X10(3) UL (ref 0–1)
IMM GRANULOCYTES NFR BLD: 0.5 %
LYMPHOCYTES # BLD AUTO: 1.58 X10(3) UL (ref 1–4)
LYMPHOCYTES NFR BLD AUTO: 20.2 %
MCH RBC QN AUTO: 29.5 PG (ref 26–34)
MCHC RBC AUTO-ENTMCNC: 33.1 G/DL (ref 31–37)
MCV RBC AUTO: 89.1 FL (ref 80–100)
MONOCYTES # BLD AUTO: 0.85 X10(3) UL (ref 0.1–1)
MONOCYTES NFR BLD AUTO: 10.8 %
NEUTROPHILS # BLD AUTO: 4.81 X10 (3) UL (ref 1.5–7.7)
NEUTROPHILS # BLD AUTO: 4.81 X10(3) UL (ref 1.5–7.7)
NEUTROPHILS NFR BLD AUTO: 61.4 %
PLATELET # BLD AUTO: 187 10(3)UL (ref 150–450)
RBC # BLD AUTO: 4.31 X10(6)UL (ref 3.8–5.8)
WBC # BLD AUTO: 7.8 X10(3) UL (ref 4–11)

## 2025-08-26 PROCEDURE — 36415 COLL VENOUS BLD VENIPUNCTURE: CPT

## 2025-08-26 PROCEDURE — 85025 COMPLETE CBC W/AUTO DIFF WBC: CPT

## (undated) DEVICE — SET PRIMARY IV MACROBORE 100IN

## (undated) DEVICE — SLEEVE KENDALL SCD EXPRESS MED

## (undated) DEVICE — FIBER XPS MOXY

## (undated) DEVICE — SOL  0.9% 1000ML

## (undated) DEVICE — URINE DRAINAGE BAG,NEEDLE SAMPLING, ANTI-REFLUX DEVICE, DRAIN TUBE: Brand: DOVER

## (undated) DEVICE — UROLOGY DRAIN BAG

## (undated) DEVICE — CYSTO PACK: Brand: MEDLINE INDUSTRIES, INC.

## (undated) DEVICE — ENCORE® LATEX ACCLAIM SIZE 8, STERILE LATEX POWDER-FREE SURGICAL GLOVE: Brand: ENCORE

## (undated) DEVICE — SOLUTION  .9 3000ML

## (undated) DEVICE — CATH URTH BDX IC 20FR FL 3

## (undated) DEVICE — STERILE WATER 1000ML BTL